# Patient Record
Sex: FEMALE | Race: OTHER | HISPANIC OR LATINO | URBAN - METROPOLITAN AREA
[De-identification: names, ages, dates, MRNs, and addresses within clinical notes are randomized per-mention and may not be internally consistent; named-entity substitution may affect disease eponyms.]

---

## 2018-05-25 ENCOUNTER — OUTPATIENT (OUTPATIENT)
Dept: OUTPATIENT SERVICES | Facility: HOSPITAL | Age: 53
LOS: 1 days | Discharge: HOME | End: 2018-05-25

## 2018-05-25 DIAGNOSIS — R10.9 UNSPECIFIED ABDOMINAL PAIN: ICD-10-CM

## 2018-06-25 ENCOUNTER — APPOINTMENT (OUTPATIENT)
Dept: VASCULAR SURGERY | Facility: CLINIC | Age: 53
End: 2018-06-25
Payer: COMMERCIAL

## 2018-06-25 VITALS
SYSTOLIC BLOOD PRESSURE: 130 MMHG | WEIGHT: 245 LBS | DIASTOLIC BLOOD PRESSURE: 80 MMHG | HEIGHT: 61 IN | BODY MASS INDEX: 46.26 KG/M2

## 2018-06-25 PROCEDURE — 99203 OFFICE O/P NEW LOW 30 MIN: CPT

## 2018-07-13 ENCOUNTER — APPOINTMENT (OUTPATIENT)
Dept: GASTROENTEROLOGY | Facility: CLINIC | Age: 53
End: 2018-07-13

## 2018-07-13 ENCOUNTER — OUTPATIENT (OUTPATIENT)
Dept: OUTPATIENT SERVICES | Facility: HOSPITAL | Age: 53
LOS: 1 days | Discharge: HOME | End: 2018-07-13

## 2018-07-13 VITALS — DIASTOLIC BLOOD PRESSURE: 86 MMHG | HEART RATE: 69 BPM | SYSTOLIC BLOOD PRESSURE: 123 MMHG | TEMPERATURE: 97.9 F

## 2018-08-27 ENCOUNTER — APPOINTMENT (OUTPATIENT)
Dept: VASCULAR SURGERY | Facility: CLINIC | Age: 53
End: 2018-08-27

## 2018-09-27 ENCOUNTER — APPOINTMENT (OUTPATIENT)
Dept: VASCULAR SURGERY | Facility: CLINIC | Age: 53
End: 2018-09-27

## 2019-01-31 ENCOUNTER — RESULT REVIEW (OUTPATIENT)
Age: 54
End: 2019-01-31

## 2019-01-31 ENCOUNTER — TRANSCRIPTION ENCOUNTER (OUTPATIENT)
Age: 54
End: 2019-01-31

## 2019-01-31 ENCOUNTER — OUTPATIENT (OUTPATIENT)
Dept: OUTPATIENT SERVICES | Facility: HOSPITAL | Age: 54
LOS: 1 days | Discharge: HOME | End: 2019-01-31

## 2019-01-31 VITALS — SYSTOLIC BLOOD PRESSURE: 107 MMHG | DIASTOLIC BLOOD PRESSURE: 91 MMHG | HEART RATE: 69 BPM | RESPIRATION RATE: 18 BRPM

## 2019-01-31 VITALS
TEMPERATURE: 99 F | HEIGHT: 61 IN | DIASTOLIC BLOOD PRESSURE: 79 MMHG | WEIGHT: 253.09 LBS | RESPIRATION RATE: 18 BRPM | SYSTOLIC BLOOD PRESSURE: 143 MMHG | HEART RATE: 62 BPM

## 2019-01-31 NOTE — CHART NOTE - NSCHARTNOTEFT_GEN_A_CORE
Patient underwent EGD with sedation. In recovery area patient was rubbing her left eye with a towel. She now complains that her left eye burns. I examined her at the bedside. She has intact eye movement and vision in both eyes. The left eye is a little red. We spoke via  # [id number] and she reports a burning sensation that is tolerable. She denies any foreign body sensation. Her eye was irrigated with saline by the CRNA. I advised that she not rub her eye anymore and see if it improves. If there is no improvement by tomorrow suggested she see an eye doctor and/or primary doctor. If sx worsen or vision is impaired recommended ER. Discussed plan with nurses in recovery who noted it and plan to follow up with her tomorrow via phone. Also informed Dr. Prince who agreed with plan.     Rom Bullard MD  Anesthesia

## 2019-01-31 NOTE — ASU DISCHARGE PLAN (ADULT/PEDIATRIC). - NOTIFY
Swelling that continues/Inability to Tolerate Liquids or Foods/Persistent Nausea and Vomiting/Fever greater than 101/Excessive Diarrhea/Bleeding that does not stop/Pain not relieved by Medications/Unable to Urinate

## 2019-01-31 NOTE — H&P PST ADULT - ATTENDING COMMENTS
Pt seen and consent obtained via the translation phone.  Pt is having egd for symptoms of GERD and for pre op assessment for bariatric surgery.

## 2019-02-04 LAB — SURGICAL PATHOLOGY STUDY: SIGNIFICANT CHANGE UP

## 2019-02-07 DIAGNOSIS — K29.50 UNSPECIFIED CHRONIC GASTRITIS WITHOUT BLEEDING: ICD-10-CM

## 2019-02-07 DIAGNOSIS — B96.81 HELICOBACTER PYLORI [H. PYLORI] AS THE CAUSE OF DISEASES CLASSIFIED ELSEWHERE: ICD-10-CM

## 2019-02-07 DIAGNOSIS — R12 HEARTBURN: ICD-10-CM

## 2019-02-21 PROBLEM — Z00.00 ENCOUNTER FOR PREVENTIVE HEALTH EXAMINATION: Noted: 2019-02-21

## 2019-03-08 ENCOUNTER — APPOINTMENT (OUTPATIENT)
Dept: GASTROENTEROLOGY | Facility: CLINIC | Age: 54
End: 2019-03-08

## 2019-03-08 ENCOUNTER — OUTPATIENT (OUTPATIENT)
Dept: OUTPATIENT SERVICES | Facility: HOSPITAL | Age: 54
LOS: 1 days | Discharge: HOME | End: 2019-03-08

## 2019-03-08 VITALS
BODY MASS INDEX: 47.58 KG/M2 | HEART RATE: 65 BPM | WEIGHT: 252 LBS | HEIGHT: 61 IN | DIASTOLIC BLOOD PRESSURE: 85 MMHG | SYSTOLIC BLOOD PRESSURE: 136 MMHG

## 2019-03-08 NOTE — ASSESSMENT
[FreeTextEntry1] : 53 year old female with PMH of HTN , varicose veins is here for follow up after EGD for the  evaluation for heartburn and preop clearance for bariatric surgery.\par Patient reports having EGD and colonoscopy in [place] 3 years ago and results are reported to be fine. no FH of cancer colon. \par EGD revealed a MARCIE lesion in the pharynx and gastritis , H pylori positive. \par \par # H Pylori Gastritis : \par Will treat with triple therapy. \par Will confirm eradication with stool testing.\par \par # Sub Epithelial lesion in the pharynx. \par Will refer to ENT for eval.\par \par # CRC Screening : \par Up to date with screening.\par Weight loss with diet and exercise advised.\par  Follow up with Bariatric surgery.

## 2019-03-08 NOTE — HISTORY OF PRESENT ILLNESS
[de-identified] : 53 year old female with PMH of HTN , varicose veins is here for follow up after EGD for the  evaluation for heartburn and preop clearance for bariatric surgery. patient reports having heartburn and epigastric pain for almost 10 years , worse with certain food ( like pasta or caffeine) associated with sore throat, occasional dysphagia. no nausea, vomiting , no hematemesis, melena or bleeding per rectum. no change in bowel habit. patient reports having EGD and colonoscopy in [place] 3 years ago and results are reported to be fine. no FH of cancer colon. \par EGD revealed a MARCIE lesion in the pharynx and gastritis , H pylori positive. \par Pt just started her PPI one day ago.

## 2019-03-15 DIAGNOSIS — K21.9 GASTRO-ESOPHAGEAL REFLUX DISEASE WITHOUT ESOPHAGITIS: ICD-10-CM

## 2019-03-15 DIAGNOSIS — K29.70 GASTRITIS, UNSPECIFIED, WITHOUT BLEEDING: ICD-10-CM

## 2019-03-22 ENCOUNTER — APPOINTMENT (OUTPATIENT)
Dept: GASTROENTEROLOGY | Facility: CLINIC | Age: 54
End: 2019-03-22

## 2019-05-03 ENCOUNTER — OUTPATIENT (OUTPATIENT)
Dept: OUTPATIENT SERVICES | Facility: HOSPITAL | Age: 54
LOS: 1 days | Discharge: HOME | End: 2019-05-03

## 2019-05-03 ENCOUNTER — APPOINTMENT (OUTPATIENT)
Dept: GASTROENTEROLOGY | Facility: CLINIC | Age: 54
End: 2019-05-03

## 2019-05-03 VITALS
BODY MASS INDEX: 47.39 KG/M2 | WEIGHT: 251 LBS | SYSTOLIC BLOOD PRESSURE: 170 MMHG | HEIGHT: 61 IN | DIASTOLIC BLOOD PRESSURE: 91 MMHG

## 2019-05-03 NOTE — HISTORY OF PRESENT ILLNESS
[Heartburn] : denies heartburn [Vomiting] : denies vomiting [Nausea] : denies nausea [Diarrhea] : denies diarrhea [Constipation] : denies constipation [Yellow Skin Or Eyes (Jaundice)] : denies jaundice [Abdominal Pain] : denies abdominal pain [Abdominal Swelling] : denies abdominal swelling [Rectal Pain] : denies rectal pain [de-identified] : 55 Y/O F is here for the follow up. [de-identified] : 53 Y/O F with PMH of epigastric pain, HTN was here last time in for epigastric pain and for clearance for Bariatric surgery.\par \par Upper GI endoscopy was done. \par Non erosive gastritis with biopsy positive for H.pylori.\par \par Eradication confirmed in April by stool test.\par \par Colonoscopy was done in

## 2019-05-03 NOTE — ASSESSMENT
[FreeTextEntry1] : 55 Y/O F with PMH of epigastric pain, HTN was here last time in for epigastric pain and for clearance for Bariatric surgery.\par \par Upper GI endoscopy was done. \par Non erosive gastritis with biopsy positive for H.pylori.\par Eradication confirmed in April/ 2019 by stool test.\par -Patient advised to set up an appointment with PMD to get a referral for bariatric surgery if needed.\par -Also during upper GI endoscopy, a large subepithelial lesion was identified.\par -ENT referral is arranged.\par \par Colonoscopy was done in [place] 2 years ago which was normal according to the patient.\par \par Follow up as needed\par

## 2019-05-03 NOTE — END OF VISIT
[FreeTextEntry3] : Pt is to see ENT for a pharyngeal MARCIE noted on recent egd and has an appt.  She is also to follow up with her PMD for further recommendations regarding treatement for obesity.

## 2019-05-03 NOTE — PHYSICAL EXAM
[General Appearance - Alert] : alert [Sclera] : the sclera and conjunctiva were normal [General Appearance - In No Acute Distress] : in no acute distress [Outer Ear] : the ears and nose were normal in appearance [Neck Appearance] : the appearance of the neck was normal [] : no respiratory distress [Arterial Pulses Carotid] : carotid pulses were normal with no bruits [Breast Appearance] : normal in appearance [Apical Impulse] : the apical impulse was normal [Bowel Sounds] : normal bowel sounds [No CVA Tenderness] : no ~M costovertebral angle tenderness [Oriented To Time, Place, And Person] : oriented to person, place, and time [Abnormal Walk] : normal gait

## 2019-05-08 ENCOUNTER — APPOINTMENT (OUTPATIENT)
Dept: OTOLARYNGOLOGY | Facility: CLINIC | Age: 54
End: 2019-05-08

## 2019-05-15 LAB — H PYLORI AG STL QL: NOT DETECTED

## 2019-05-16 ENCOUNTER — APPOINTMENT (OUTPATIENT)
Dept: OTOLARYNGOLOGY | Facility: CLINIC | Age: 54
End: 2019-05-16
Payer: MEDICAID

## 2019-05-16 DIAGNOSIS — J38.7 OTHER DISEASES OF LARYNX: ICD-10-CM

## 2019-05-16 DIAGNOSIS — D37.02 NEOPLASM OF UNCERTAIN BEHAVIOR OF TONGUE: ICD-10-CM

## 2019-05-16 PROCEDURE — 99203 OFFICE O/P NEW LOW 30 MIN: CPT | Mod: 25

## 2019-05-16 PROCEDURE — 31575 DIAGNOSTIC LARYNGOSCOPY: CPT

## 2019-05-16 NOTE — HISTORY OF PRESENT ILLNESS
[de-identified] :  ID# [id number]\par 54 Year old female comes in the office as a new patient c/o pharyngeal MARCIE.  [Neck Mass] : no neck mass [Difficulty Swallowing] : no difficulty swallowing [Painful Swallowing] : no painful swallowing [Hoarseness] : no hoarseness [Shortness of Breath] : no shortness of breath

## 2019-05-16 NOTE — PROCEDURE
[Topical Lidocaine] : topical lidocaine [Oxymetazoline HCl] : oxymetazoline HCl [Flexible Endoscope] : examined with the flexible endoscope [Normal] : posterior cricoid area had healthy pink mucosa in the interarytenoid area and the esophageal inlet [de-identified] : right BOT mass in vallecula

## 2019-06-14 ENCOUNTER — APPOINTMENT (OUTPATIENT)
Dept: SURGERY | Facility: CLINIC | Age: 54
End: 2019-06-14
Payer: MEDICAID

## 2019-06-14 VITALS
HEIGHT: 61 IN | WEIGHT: 253.2 LBS | DIASTOLIC BLOOD PRESSURE: 90 MMHG | BODY MASS INDEX: 47.8 KG/M2 | SYSTOLIC BLOOD PRESSURE: 148 MMHG

## 2019-06-14 PROCEDURE — 99204 OFFICE O/P NEW MOD 45 MIN: CPT

## 2019-06-14 RX ORDER — AMOXICILLIN 500 MG/1
500 TABLET, FILM COATED ORAL TWICE DAILY
Qty: 56 | Refills: 0 | Status: COMPLETED | COMMUNITY
Start: 2019-03-08 | End: 2019-06-14

## 2019-06-14 RX ORDER — CLARITHROMYCIN 500 MG/1
500 TABLET, FILM COATED ORAL TWICE DAILY
Qty: 28 | Refills: 0 | Status: COMPLETED | COMMUNITY
Start: 2019-03-08 | End: 2019-06-14

## 2019-06-19 ENCOUNTER — APPOINTMENT (OUTPATIENT)
Dept: SURGERY | Facility: CLINIC | Age: 54
End: 2019-06-19
Payer: SELF-PAY

## 2019-06-19 VITALS
HEIGHT: 61 IN | WEIGHT: 254 LBS | SYSTOLIC BLOOD PRESSURE: 142 MMHG | DIASTOLIC BLOOD PRESSURE: 84 MMHG | BODY MASS INDEX: 47.95 KG/M2

## 2019-06-19 PROCEDURE — ZZZZZ: CPT

## 2019-06-19 PROCEDURE — SI006: CPT

## 2019-06-26 NOTE — PHYSICAL EXAM
[Obese, well nourished, in no acute distress] : obese, well nourished, in no acute distress [Normal] : normal appearance, no rash, nodules, vesicles, ulcers, erythema [de-identified] : soft, obese, well-healing infraumbilical scar, well-healed pfannenstiel incision, no hernias, no masses [de-identified] : tearing up, mild anxiety

## 2019-06-26 NOTE — HISTORY OF PRESENT ILLNESS
[de-identified] : 55yo female with PMHx of HTN, GERD, headaches, VASQUEZ, back/LE joint pain, and morbid obesity with BMI 47.8 presenting for consultation of weight loss surgery/management. Patient states she has struggled with her weight since she had her hysterectomy 20 years ago and quit smoking 15 years ago. She reports that she weighed 145lb at that time, but quickly gained over 100lbs. She believes she eats correctly under the supervision of nutritionists; however, she has been gaining weight under their care. At this time, she does not have a regular exercise regimen, but she states that she ambulates a lot throughout the day. She does not do any form of resistance training. Of note, she reports occasional chest pain at rest that is worse with exertion. \par \par PCP - Dr. Crespo [phone]\par Previous EGD - 1/2019, H pylori+ treated with antibiotics\par Colonoscopy 3-4 years ago\par Smoker - former, quit 15 years ago

## 2019-06-26 NOTE — REASON FOR VISIT
[Initial Consult] : an initial consult for [Morbid Obesity (BMI>40)] : morbid obesity (bmi>40) [Family Member] : family member [FreeTextEntry1] : [id number]

## 2019-06-26 NOTE — CONSULT LETTER
[Please see my note below.] : Please see my note below. [Sincerely,] : Sincerely, [Dear  ___] : Dear  [unfilled], [FreeTextEntry3] : Drea White MD\par Bariatric & Minimally Invasive Surgery\par [facility]\par [phone]\par

## 2019-06-26 NOTE — ASSESSMENT
[FreeTextEntry1] : 53yo female with PMHx of HTN, GERD, headaches, VASQUEZ, back/LE joint pain, and morbid obesity with BMI 47.8 presenting for consultation of weight loss surgery/management. \par -discussed surgical options - gastric band, sleeve gastrectomy, moon-en-Y gastric bypass\par -discussed potential surgical complications for each option - including bleeding, infection, pain, hernia, leak, stricture, and blood clots\par -discussed smoking is prohibited\par -at this time, this patient only wants to have SLEEVE GASTRECTOMY\par -next step - bariatric education session at nearest convenience - scheduled for 6/19/19\par -pre-operative preparation - will include labs, PCP evaluation, EGD, nutritional evaluation (6 months), sleep study\par -will recommend nutritional evaluation with [facility] dietician\par -will require work up with cardiology regarding intermittent chest pain at rest\par -GERD - controlled with Omeprazole once/day, will need another EGD\par -return to office after further pre-operative work up is completed

## 2019-07-11 ENCOUNTER — APPOINTMENT (OUTPATIENT)
Dept: CARDIOLOGY | Facility: CLINIC | Age: 54
End: 2019-07-11

## 2019-07-11 ENCOUNTER — APPOINTMENT (OUTPATIENT)
Dept: CARDIOLOGY | Facility: CLINIC | Age: 54
End: 2019-07-11
Payer: MEDICAID

## 2019-07-11 ENCOUNTER — APPOINTMENT (OUTPATIENT)
Dept: SURGERY | Facility: CLINIC | Age: 54
End: 2019-07-11
Payer: MEDICAID

## 2019-07-11 VITALS — BODY MASS INDEX: 48.52 KG/M2 | HEIGHT: 61 IN | WEIGHT: 257 LBS

## 2019-07-11 PROCEDURE — 97802 MEDICAL NUTRITION INDIV IN: CPT

## 2019-07-11 PROCEDURE — 93000 ELECTROCARDIOGRAM COMPLETE: CPT

## 2019-07-11 PROCEDURE — 99204 OFFICE O/P NEW MOD 45 MIN: CPT | Mod: 25

## 2019-08-07 ENCOUNTER — APPOINTMENT (OUTPATIENT)
Dept: CARDIOLOGY | Facility: CLINIC | Age: 54
End: 2019-08-07

## 2019-08-12 ENCOUNTER — APPOINTMENT (OUTPATIENT)
Dept: SURGERY | Facility: CLINIC | Age: 54
End: 2019-08-12
Payer: MEDICAID

## 2019-08-12 VITALS — BODY MASS INDEX: 47.28 KG/M2 | WEIGHT: 250.4 LBS | HEIGHT: 61 IN

## 2019-08-12 PROCEDURE — 99212 OFFICE O/P EST SF 10 MIN: CPT

## 2019-08-19 ENCOUNTER — OUTPATIENT (OUTPATIENT)
Dept: OUTPATIENT SERVICES | Facility: HOSPITAL | Age: 54
LOS: 1 days | Discharge: HOME | End: 2019-08-19

## 2019-08-19 DIAGNOSIS — F50.9 EATING DISORDER, UNSPECIFIED: ICD-10-CM

## 2019-08-30 ENCOUNTER — OUTPATIENT (OUTPATIENT)
Dept: OUTPATIENT SERVICES | Facility: HOSPITAL | Age: 54
LOS: 1 days | Discharge: HOME | End: 2019-08-30

## 2019-08-30 ENCOUNTER — APPOINTMENT (OUTPATIENT)
Dept: GASTROENTEROLOGY | Facility: CLINIC | Age: 54
End: 2019-08-30
Payer: MEDICAID

## 2019-08-30 VITALS
HEART RATE: 49 BPM | WEIGHT: 247 LBS | DIASTOLIC BLOOD PRESSURE: 97 MMHG | BODY MASS INDEX: 46.63 KG/M2 | HEIGHT: 61 IN | SYSTOLIC BLOOD PRESSURE: 171 MMHG

## 2019-08-30 PROCEDURE — 99213 OFFICE O/P EST LOW 20 MIN: CPT | Mod: GC

## 2019-08-30 NOTE — PHYSICAL EXAM
[General Appearance - Alert] : alert [General Appearance - In No Acute Distress] : in no acute distress [Sclera] : the sclera and conjunctiva were normal [PERRL With Normal Accommodation] : pupils were equal in size, round, and reactive to light [Extraocular Movements] : extraocular movements were intact [Hearing Threshold Finger Rub Not [place]] : hearing was normal [Both Tympanic Membranes Were Examined] : both tympanic membranes were normal [Neck Appearance] : the appearance of the neck was normal [Neck Cervical Mass (___cm)] : no neck mass was observed [] : no respiratory distress [Respiration, Rhythm And Depth] : normal respiratory rhythm and effort [Exaggerated Use Of Accessory Muscles For Inspiration] : no accessory muscle use [Auscultation Breath Sounds / Voice Sounds] : lungs were clear to auscultation bilaterally [Apical Impulse] : the apical impulse was normal [Heart Rate And Rhythm] : heart rate was normal and rhythm regular [Murmurs] : no murmurs [Heart Sounds] : normal S1 and S2 [Heart Sounds Gallop] : no gallops [Heart Sounds Pericardial Friction Rub] : no pericardial rub [Arterial Pulses Carotid] : carotid pulses were normal with no bruits [Abdominal Aorta] : the abdominal aorta was normal [Bowel Sounds] : normal bowel sounds [Abdomen Soft] : soft [Abdomen Tenderness] : non-tender [Abdomen Mass (___ Cm)] : no abdominal mass palpated [No CVA Tenderness] : no ~M costovertebral angle tenderness [Nail Clubbing] : no clubbing  or cyanosis of the fingernails [Skin Color & Pigmentation] : normal skin color and pigmentation [Musculoskeletal - Swelling] : no joint swelling seen [Skin Turgor] : normal skin turgor [Deep Tendon Reflexes (DTR)] : deep tendon reflexes were 2+ and symmetric [Sensation] : the sensory exam was normal to light touch and pinprick [Oriented To Time, Place, And Person] : oriented to person, place, and time [FreeTextEntry1] : Obese appearing female

## 2019-08-30 NOTE — ASSESSMENT
[FreeTextEntry1] : 55 year old female with PMHx of GERD, HTN, H. pylori gastritis s/p eradication presented for clearance for bariatric surgery\par \par #GERD/Gastritis \par - renew protonix\par \par #Obesity\par - Will need clearance for surgery from primary care physician; f\par - Dr. White is on same EMR and has access to all testing results. \par \par # h/o H. 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 \par - Awaiting for authorization for CT

## 2019-08-30 NOTE — HISTORY OF PRESENT ILLNESS
[None] : had no significant interval events [Heartburn] : denies heartburn [Nausea] : denies nausea [Vomiting] : denies vomiting [Diarrhea] : denies diarrhea [Constipation] : denies constipation [Yellow Skin Or Eyes (Jaundice)] : denies jaundice [Abdominal Pain] : denies abdominal pain [Abdominal Swelling] : denies abdominal swelling [Rectal Pain] : denies rectal pain [GERD] : gastroesophageal reflux disease [_________] : Performed [unfilled] [de-identified] : This is a 54 year old female with PMHx of HTN, H pylori gastritis, HTN who presents to the GI clinic for follow up for bariatric surgery with copies of test results. Reports no acute complaints at this time. States that she is feeling fine. Pt. PCP is Dr. Crespo. Surgeon to perform the surgery: Dr. White.  [de-identified] : normal mucosa, non erosive gastritis. H. pylori positive. Subepithelial lesion in pharynx and lateral uvula

## 2019-08-30 NOTE — END OF VISIT
[FreeTextEntry3] : Pt seen and examined.  Pt was HP positive and was treated.  Eradication has been confirmed.  Pt had gastritis on egd in Jan and is on pantoprazole which will be renewed.  She is due to see ENT in follow up for a pharyngeal sub epithelial lesion and will over to their office today.

## 2019-09-06 ENCOUNTER — OUTPATIENT (OUTPATIENT)
Dept: OUTPATIENT SERVICES | Facility: HOSPITAL | Age: 54
LOS: 1 days | Discharge: HOME | End: 2019-09-06

## 2019-09-06 ENCOUNTER — APPOINTMENT (OUTPATIENT)
Dept: PULMONOLOGY | Facility: CLINIC | Age: 54
End: 2019-09-06
Payer: MEDICAID

## 2019-09-06 VITALS
TEMPERATURE: 98.1 F | WEIGHT: 252 LBS | HEIGHT: 61 IN | DIASTOLIC BLOOD PRESSURE: 82 MMHG | SYSTOLIC BLOOD PRESSURE: 164 MMHG | OXYGEN SATURATION: 98 % | HEART RATE: 63 BPM | BODY MASS INDEX: 47.58 KG/M2

## 2019-09-06 PROCEDURE — 99203 OFFICE O/P NEW LOW 30 MIN: CPT | Mod: GC

## 2019-09-06 NOTE — HISTORY OF PRESENT ILLNESS
[Difficulty Breathing During Exertion] : denies dyspnea on exertion [Feelings Of Weakness On Exertion] : denies exercise intolerance [Cough] : denies coughing [Wheezing] : denies wheezing [Regional Soft Tissue Swelling Both Lower Extremities] : denies lower extremity edema [Chest Pain Or Discomfort] : denies chest pain [Fever] : denies fever [___ Year Quit] : ~He/She~ quit smoking in [unfilled] [___ Pack Year History] : [unfilled] pack year history [Snoring] : Snoring [Stable] : are stable [de-identified] : Pt presents for bariatric surgery clearance, awaiting CT for subendothelial pharyngeal mass. [FreeTextEntry1] :  Anel ID [id number]\par  Woody ID [id number]\par \par This is a 54 year old female with PMHx of HTN, H pylori gastritis s/p confirmed eradication, who presents to the pulmonology clinic for  bariatric surgery clearance. Reports no acute complaints at this time. States that she is feeling fine. Denies dyspnea, chest pain, edema, wheezing, coughing. Admits some snoring but denies frequent awakening. Pt didn’t get neck CT or follow up with ENT yet. \par \par PCP is Dr. Crespo. Surgeon to perform the surgery: Dr. White.

## 2019-09-06 NOTE — PHYSICAL EXAM
[General Appearance - Well Developed] : well developed [Normal Appearance] : normal appearance [Neck Appearance] : the appearance of the neck was normal [Heart Rate And Rhythm] : heart rate and rhythm were normal [Heart Sounds] : normal S1 and S2 [] : no respiratory distress [Respiration, Rhythm And Depth] : normal respiratory rhythm and effort [Exaggerated Use Of Accessory Muscles For Inspiration] : no accessory muscle use [Bowel Sounds] : normal bowel sounds [Auscultation Breath Sounds / Voice Sounds] : lungs were clear to auscultation bilaterally [Abdomen Soft] : soft [Abdomen Tenderness] : non-tender [Oriented To Time, Place, And Person] : oriented to person, place, and time [Impaired Insight] : insight and judgment were intact [Affect] : the affect was normal [FreeTextEntry1] : Right tonsillar area slightly enlarged

## 2019-09-06 NOTE — ASSESSMENT
[FreeTextEntry1] : 55 year old female with PMHx of GERD, HTN, H. 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 \par - Dr. White is on same EMR and has access to all testing results. \par \par \par Pt can return in one month or after ENT diagnosis is confirmed regarding possible need for sleep study and bariatric surgery clearance.

## 2019-09-06 NOTE — END OF VISIT
[] : Resident [Time Spent: ___ minutes] : I have spent [unfilled] minutes of face to face time with the patient [FreeTextEntry3] : needs ent reassessment asap

## 2019-09-09 ENCOUNTER — FORM ENCOUNTER (OUTPATIENT)
Age: 54
End: 2019-09-09

## 2019-09-10 ENCOUNTER — APPOINTMENT (OUTPATIENT)
Dept: SURGERY | Facility: CLINIC | Age: 54
End: 2019-09-10
Payer: MEDICAID

## 2019-09-10 ENCOUNTER — OUTPATIENT (OUTPATIENT)
Dept: OUTPATIENT SERVICES | Facility: HOSPITAL | Age: 54
LOS: 1 days | Discharge: HOME | End: 2019-09-10
Payer: MEDICAID

## 2019-09-10 VITALS — BODY MASS INDEX: 47.73 KG/M2 | WEIGHT: 252.8 LBS | HEIGHT: 61 IN

## 2019-09-10 DIAGNOSIS — J38.7 OTHER DISEASES OF LARYNX: ICD-10-CM

## 2019-09-10 DIAGNOSIS — I83.893 VARICOSE VEINS OF BILATERAL LOWER EXTREMITIES WITH OTHER COMPLICATIONS: ICD-10-CM

## 2019-09-10 PROCEDURE — 99212 OFFICE O/P EST SF 10 MIN: CPT

## 2019-09-10 PROCEDURE — 70492 CT SFT TSUE NCK W/O & W/DYE: CPT | Mod: 26

## 2019-09-11 ENCOUNTER — APPOINTMENT (OUTPATIENT)
Dept: OTOLARYNGOLOGY | Facility: CLINIC | Age: 54
End: 2019-09-11
Payer: MEDICAID

## 2019-09-11 DIAGNOSIS — R59.0 LOCALIZED ENLARGED LYMPH NODES: ICD-10-CM

## 2019-09-11 DIAGNOSIS — E04.1 NONTOXIC SINGLE THYROID NODULE: ICD-10-CM

## 2019-09-11 PROCEDURE — 99214 OFFICE O/P EST MOD 30 MIN: CPT | Mod: 25

## 2019-09-11 PROCEDURE — 31231 NASAL ENDOSCOPY DX: CPT

## 2019-09-11 NOTE — PHYSICAL EXAM
[de-identified] : cervical lymphadenopathy [de-identified] : tenderness bilateral [Midline] : trachea located in midline position [Normal] : no rashes

## 2019-09-11 NOTE — PROCEDURE
[Mass] : a mass [Oxymetazoline HCl] : oxymetazoline HCl [Topical Lidocaine] : topical lidocaine [Congested] : congested [Obstructing Posterior Choanae] : the adenoids obstructed the posterior choanae [FreeTextEntry6] : The following anatomic sites were directly examined in a sequential fashion:\par The scope was introduced in the nasal passage between the middle and inferior turbinates to exam the inferior portion of the middle meatus and the fontanelle, as well as the maxillary ostia. Next, the scope was passed medically and posteriorly to the middle turbinates to examine the sphenoethmoid recess and the superior turbinate region.\par  [FreeTextEntry3] : left nasopharyngeal mass [Flexible Endoscope] : examined with the flexible endoscope [Normal] : posterior cricoid area had healthy pink mucosa in the interarytenoid area and the esophageal inlet

## 2019-09-11 NOTE — HISTORY OF PRESENT ILLNESS
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 Patient states she went for CT yesterday results were reviewed by me and demonstrated nasopharyngeal mass, enlarged level 2 lymph nodes and a thyroid nodule. . Patient c/o coughing when eating food. No dysphagia. Pt has no other complaints.

## 2019-09-24 ENCOUNTER — FORM ENCOUNTER (OUTPATIENT)
Age: 54
End: 2019-09-24

## 2019-09-25 ENCOUNTER — OUTPATIENT (OUTPATIENT)
Dept: OUTPATIENT SERVICES | Facility: HOSPITAL | Age: 54
LOS: 1 days | Discharge: HOME | End: 2019-09-25
Payer: MEDICAID

## 2019-09-25 ENCOUNTER — OTHER (OUTPATIENT)
Age: 54
End: 2019-09-25

## 2019-09-25 ENCOUNTER — RESULT REVIEW (OUTPATIENT)
Age: 54
End: 2019-09-25

## 2019-09-25 PROCEDURE — 88172 CYTP DX EVAL FNA 1ST EA SITE: CPT | Mod: 26

## 2019-09-25 PROCEDURE — 10005 FNA BX W/US GDN 1ST LES: CPT

## 2019-09-25 PROCEDURE — 88112 CYTOPATH CELL ENHANCE TECH: CPT | Mod: 26,59

## 2019-09-25 PROCEDURE — 88161 CYTOPATH SMEAR OTHER SOURCE: CPT | Mod: 26,59

## 2019-09-25 PROCEDURE — 88188 FLOWCYTOMETRY/READ 9-15: CPT

## 2019-09-25 PROCEDURE — 88173 CYTOPATH EVAL FNA REPORT: CPT | Mod: 26

## 2019-09-25 PROCEDURE — 88177 CYTP FNA EVAL EA ADDL: CPT | Mod: 26

## 2019-09-25 PROCEDURE — 88305 TISSUE EXAM BY PATHOLOGIST: CPT | Mod: 26

## 2019-09-25 NOTE — PROGRESS NOTE ADULT - SUBJECTIVE AND OBJECTIVE BOX
INTERVENTIONAL RADIOLOGY BRIEF-OPERATIVE NOTE    Procedure: R isthmus nx; left neck LN biopsy    Pre-Op Diagnosis:  NUO    Post-Op Diagnosis:  NUO    Attending: Annie  Resident: None    Anesthesia (type):  [ ] General Anesthesia  [ ] Sedation  [ ] Spinal Anesthesia  [ x] Local/Regional    Contrast: 0    Estimated Blood Loss: 0    Condition:   [ ] Critical  [ ] Serious  [ ] Fair   [x ] Good    Findings/Follow up Plan of Care:  2.5 cm r isthmus nodule; 3 FNAs                                                   3.5 cm left neck LN- 3 cores    Specimens Removed: As above    Implants:  NA    Complications: NA    Disposition: DC home      Please call Interventional Radiology [phone] with any questions, concerns, or issues.

## 2019-09-26 ENCOUNTER — LABORATORY RESULT (OUTPATIENT)
Age: 54
End: 2019-09-26

## 2019-09-26 ENCOUNTER — APPOINTMENT (OUTPATIENT)
Dept: OTOLARYNGOLOGY | Facility: CLINIC | Age: 54
End: 2019-09-26
Payer: MEDICAID

## 2019-09-26 PROCEDURE — 42804 BIOPSY OF UPPER NOSE/THROAT: CPT

## 2019-09-26 PROCEDURE — 31231 NASAL ENDOSCOPY DX: CPT

## 2019-09-30 LAB — NON-GYNECOLOGICAL CYTOLOGY STUDY: SIGNIFICANT CHANGE UP

## 2019-10-01 ENCOUNTER — OUTPATIENT (OUTPATIENT)
Dept: OUTPATIENT SERVICES | Facility: HOSPITAL | Age: 54
LOS: 1 days | End: 2019-10-01
Payer: MEDICAID

## 2019-10-01 LAB — TM INTERPRETATION: SIGNIFICANT CHANGE UP

## 2019-10-02 LAB — NON-GYNECOLOGICAL CYTOLOGY STUDY: SIGNIFICANT CHANGE UP

## 2019-10-04 DIAGNOSIS — R59.9 ENLARGED LYMPH NODES, UNSPECIFIED: ICD-10-CM

## 2019-10-04 DIAGNOSIS — E04.1 NONTOXIC SINGLE THYROID NODULE: ICD-10-CM

## 2019-10-08 ENCOUNTER — APPOINTMENT (OUTPATIENT)
Dept: SURGERY | Facility: CLINIC | Age: 54
End: 2019-10-08

## 2019-10-10 ENCOUNTER — MESSAGE (OUTPATIENT)
Age: 54
End: 2019-10-10

## 2019-10-10 ENCOUNTER — APPOINTMENT (OUTPATIENT)
Dept: CARDIOLOGY | Facility: CLINIC | Age: 54
End: 2019-10-10

## 2019-10-11 ENCOUNTER — OUTPATIENT (OUTPATIENT)
Dept: OUTPATIENT SERVICES | Facility: HOSPITAL | Age: 54
LOS: 1 days | Discharge: HOME | End: 2019-10-11

## 2019-10-11 ENCOUNTER — APPOINTMENT (OUTPATIENT)
Dept: PULMONOLOGY | Facility: CLINIC | Age: 54
End: 2019-10-11
Payer: MEDICAID

## 2019-10-11 VITALS
HEART RATE: 65 BPM | WEIGHT: 251 LBS | BODY MASS INDEX: 47.39 KG/M2 | SYSTOLIC BLOOD PRESSURE: 141 MMHG | HEIGHT: 61 IN | OXYGEN SATURATION: 98 % | DIASTOLIC BLOOD PRESSURE: 83 MMHG

## 2019-10-11 LAB
ANION GAP SERPL CALC-SCNC: 12 MMOL/L
BASOPHILS # BLD AUTO: 0.06 K/UL
BASOPHILS NFR BLD AUTO: 0.6 %
BUN SERPL-MCNC: 10 MG/DL
CALCIUM SERPL-MCNC: 9.3 MG/DL
CHLORIDE SERPL-SCNC: 103 MMOL/L
CO2 SERPL-SCNC: 27 MMOL/L
CREAT SERPL-MCNC: 0.6 MG/DL
EOSINOPHIL # BLD AUTO: 0.23 K/UL
EOSINOPHIL NFR BLD AUTO: 2.3 %
GLUCOSE SERPL-MCNC: 82 MG/DL
HCT VFR BLD CALC: 39.3 %
HGB BLD-MCNC: 12.4 G/DL
IMM GRANULOCYTES NFR BLD AUTO: 0.4 %
LYMPHOCYTES # BLD AUTO: 2.82 K/UL
LYMPHOCYTES NFR BLD AUTO: 27.7 %
MAN DIFF?: NORMAL
MCHC RBC-ENTMCNC: 23.6 PG
MCHC RBC-ENTMCNC: 31.6 G/DL
MCV RBC AUTO: 74.7 FL
MONOCYTES # BLD AUTO: 0.75 K/UL
MONOCYTES NFR BLD AUTO: 7.4 %
NEUTROPHILS # BLD AUTO: 6.29 K/UL
NEUTROPHILS NFR BLD AUTO: 61.6 %
PLATELET # BLD AUTO: 327 K/UL
POTASSIUM SERPL-SCNC: 5 MMOL/L
RBC # BLD: 5.26 M/UL
RBC # FLD: 15.8 %
SODIUM SERPL-SCNC: 142 MMOL/L
WBC # FLD AUTO: 10.19 K/UL

## 2019-10-11 PROCEDURE — 99213 OFFICE O/P EST LOW 20 MIN: CPT | Mod: GC

## 2019-10-11 NOTE — PHYSICAL EXAM
[General Appearance - Well Developed] : well developed [General Appearance - Well Nourished] : well nourished [Normal Appearance] : normal appearance [Well Groomed] : well groomed [No Deformities] : no deformities [General Appearance - In No Acute Distress] : no acute distress [Normal Conjunctiva] : the conjunctiva exhibited no abnormalities [Eyelids - No Xanthelasma] : the eyelids demonstrated no xanthelasmas [Normal Oropharynx] : normal oropharynx [Neck Appearance] : the appearance of the neck was normal [Neck Cervical Mass (___cm)] : no neck mass was observed [Jugular Venous Distention Increased] : there was no jugular-venous distention [Thyroid Diffuse Enlargement] : the thyroid was not enlarged [Thyroid Nodule] : there were no palpable thyroid nodules [Heart Rate And Rhythm] : heart rate and rhythm were normal [Heart Sounds] : normal S1 and S2 [Murmurs] : no murmurs present [Auscultation Breath Sounds / Voice Sounds] : lungs were clear to auscultation bilaterally [Exaggerated Use Of Accessory Muscles For Inspiration] : no accessory muscle use [Respiration, Rhythm And Depth] : normal respiratory rhythm and effort [Abdomen Soft] : soft [Abdomen Tenderness] : non-tender [Abnormal Walk] : normal gait [Abdomen Mass (___ Cm)] : no abdominal mass palpated [Gait - Sufficient For Exercise Testing] : the gait was sufficient for exercise testing [Nail Clubbing] : no clubbing of the fingernails [Cyanosis, Localized] : no localized cyanosis [Petechial Hemorrhages (___cm)] : no petechial hemorrhages [Skin Color & Pigmentation] : normal skin color and pigmentation [] : no ischemic changes [No Venous Stasis] : no venous stasis [Skin Lesions] : no skin lesions [No Skin Ulcers] : no skin ulcer [No Xanthoma] : no  xanthoma was observed [Deep Tendon Reflexes (DTR)] : deep tendon reflexes were 2+ and symmetric [Sensation] : the sensory exam was normal to light touch and pinprick [No Focal Deficits] : no focal deficits [Affect] : the affect was normal [Oriented To Time, Place, And Person] : oriented to person, place, and time [Impaired Insight] : insight and judgment were intact [FreeTextEntry1] : morbidly obese

## 2019-10-11 NOTE — ASSESSMENT
[FreeTextEntry1] : 55 year old female with PMHx of GERD, HTN, H. 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 \par  \par Suspected SEMAJ\par -morbidly obese BMI 47\par -recommend sleep study\par \par RTC after sleep study in 6-8 weeks

## 2019-10-11 NOTE — HISTORY OF PRESENT ILLNESS
[Stable] : are stable [None] : The patient is currently asymptomatic [Snoring] : snoring [FreeTextEntry1] : 54 year old female with PMHx of HTN, H pylori gastritis s/p confirmed eradication presenting for follow up after presenting to pulmonology clinic for bariatric surgery clearance. She was sent to ENT to evaluate a nasopharyngeal mass and told to come back after for the clearance. She went to ENT who did a biopsy of the mass which showed mucoid material. She was released from the bariatric surgery program and as such is no longer going to get the surgery. \par \par Otherwise she feels fine and has no current complaints. \par \par Interpretor Jacobo [phone]

## 2019-10-16 ENCOUNTER — EMERGENCY (EMERGENCY)
Facility: HOSPITAL | Age: 54
LOS: 0 days | Discharge: HOME | End: 2019-10-16
Admitting: EMERGENCY MEDICINE
Payer: MEDICAID

## 2019-10-16 VITALS
DIASTOLIC BLOOD PRESSURE: 81 MMHG | RESPIRATION RATE: 20 BRPM | SYSTOLIC BLOOD PRESSURE: 146 MMHG | TEMPERATURE: 96 F | OXYGEN SATURATION: 100 % | HEART RATE: 74 BPM
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
## 2019-10-16 DIAGNOSIS — S60.221A CONTUSION OF RIGHT HAND, INITIAL ENCOUNTER: ICD-10-CM
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
## 2019-10-16 DIAGNOSIS — M54.9 DORSALGIA, UNSPECIFIED: ICD-10-CM

## 2019-10-16 DIAGNOSIS — S69.91XA UNSPECIFIED INJURY OF RIGHT WRIST, HAND AND FINGER(S), INITIAL ENCOUNTER: ICD-10-CM

## 2019-10-16 DIAGNOSIS — R51 HEADACHE: ICD-10-CM

## 2019-10-16 DIAGNOSIS — R20.2 PARESTHESIA OF SKIN: ICD-10-CM
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
## 2019-10-16 DIAGNOSIS — M54.2 CERVICALGIA: ICD-10-CM

## 2019-10-16 PROCEDURE — 73130 X-RAY EXAM OF HAND: CPT | Mod: 26,RT

## 2019-10-16 PROCEDURE — 99283 EMERGENCY DEPT VISIT LOW MDM: CPT

## 2019-10-16 RX ORDER — IBUPROFEN 200 MG
600 TABLET ORAL ONCE
Refills: 0 | Status: COMPLETED | OUTPATIENT
Start: 2019-10-16 | End: 2019-10-16

## 2019-10-16 RX ADMIN — Medication 600 MILLIGRAM(S): at 14:04

## 2019-10-16 NOTE — ED PROVIDER NOTE - NSFOLLOWUPCLINICS_GEN_ALL_ED_FT
[facility]  Orthpedic  [address] [place], [place]   Phone: [phone]  Fax:   Follow Up Time: 1-3 Days

## 2019-10-16 NOTE — ED PROVIDER NOTE - OBJECTIVE STATEMENT
EP is a 55 y/o female with a PMH of HTN who presents to the ED s/p fall about an hour ago complaining of constant nonradiating sharp right hand pain, and left lower back. Patient states she was walking across the street and fell after walking on an unleveled patch in the road. Patient denies hitting her head. Patient states she landed on her right hand and rolled over onto her back. Patient states the fall was witnessed and was helped up be pedestrians. Patient states she was ambulatory at the scene. Patient admits to numbness and tingling in her right hand. Patient admits to neck pain, and headaches. Patient states she vomited after falling. Patient denies numbness and tingling in the lower extremities, chest pain, sob, dizziness, or visual changes.

## 2019-10-16 NOTE — ED PROVIDER NOTE - PROGRESS NOTE DETAILS
I supervised PA fellow care Patient advised of discharge instructions.  called: [phone],  name Salomon

## 2019-10-16 NOTE — ED ADULT NURSE NOTE - NSIMPLEMENTINTERV_GEN_ALL_ED
Implemented All Universal Safety Interventions:  [place] to call system. Call bell, personal items and telephone within reach. Instruct patient to call for assistance. Room bathroom lighting operational. Non-slip footwear when patient is off stretcher. Physically safe environment: no spills, clutter or unnecessary equipment. Stretcher in lowest position, wheels locked, appropriate side rails in place.

## 2019-10-16 NOTE — ED PROVIDER NOTE - NS ED ROS FT
EYES: No visual changes.  CARD: No chest pain, palpitations  RESP: No SOB, cough, hemoptysis. No hx of asthma or COPD  GI: No abdominal pain   MS: lower back pain, neck pain, and right hand pain.   NEURO: headache. no dizziness, paresthesias or LOC

## 2019-10-16 NOTE — ED PROVIDER NOTE - PATIENT PORTAL LINK FT
You can access the FollowMyHealth Patient Portal offered by [facility] by registering at the following website: [email or web address]. By joining [facility]’s FollowMyHealth portal, you will also be able to view your health information using other applications (apps) compatible with our system.

## 2019-10-16 NOTE — ED PROVIDER NOTE - NSFOLLOWUPINSTRUCTIONS_ED_ALL_ED_FT
Contusión  Theodore contusión es un hematoma profundo. Las contusiones son el resultado de theodore lesión contundente en tejidos y fibras musculares debajo de la piel. La lesión causa sangrado debajo de la piel. La piel que recubre la contusión puede volverse walt, morada o amarilla. Las lesiones menores le darán theodore contusión indolora, aubrey las contusiones más graves pueden permanecer dolorosas e inflamadas mercedez algunas semanas.    ¿Cuales son las causas?  Esta condición generalmente es causada por un golpe, un trauma o theodore fuerza directa en un área del cuerpo.    ¿Cuáles son los signos o síntomas?  Los síntomas de esta afección incluyen:    Hinchazón del área lesionada.  Dolor y sensibilidad en el área lesionada.  Descoloramiento. El área puede tener enrojecimiento y luego volverse walt, púrpura o amarillo.    ¿Cómo se diagnostica esto?  Esta condición se diagnostica con base en un examen físico y un historial médico. Es posible que se necesite theodore radiografía, theodore tomografía computarizada o theodore resonancia magnética para determinar si hay lesiones asociadas, gina fracturas de huesos.    ¿Cómo se trata esto?  El tratamiento específico para esta afección depende de qué área del cuerpo se lesionó. En general, el mejor tratamiento para theodore contusión es descansar, aplicar hielo, aplicar presión (compresión) y elevar el área lesionada. [place] a menudo se llama la estrategia RICE. También se pueden recomendar medicamentos antiinflamatorios de venta jocelyn para el control del dolor.    Siga estas instrucciones en casa:  Descansa el área lesionada.  Si se le indica, aplique hielo en el área lesionada:    Alfonso hielo en theodore bolsa de plástico.  Coloque theodore toalla entre sheridan piel y la bolsa.  Deje el hielo encendido por 20 minutos, 2-3 veces por día.    Si se le indica, aplique theodore ligera compresión en el área lesionada con theodore venda elástica. Asegúrese de que el vendaje no esté demasiado apretado. Retire y vuelva a aplicar el vendaje según las indicaciones de sheridan proveedor de atención médica.  Si es posible, eleve (eleve) el área lesionada por encima del nivel de sheridan corazón mientras está sentado o acostado.  [place] medicamentos de venta jocelyn y recetados solo según lo indicado por sheridan proveedor de atención médica.  Póngase en contacto con un proveedor de atención médica si:  Chelle síntomas no mejoran después de varios días de tratamiento.  Chelle síntomas empeoran.  Tiene dificultad para  el área lesionada.  Obtenga ayuda de inmediato si:  Tienes dolor severo.  Tiene entumecimiento en theodore mano o pie.  Sheridan mano o pie se pone pálido o frío.  Esta información no pretende reemplazar los consejos que le rasheed sheridan proveedor de atención médica. Asegúrese de discutir cualquier pregunta que tenga con sheridan proveedor de atención médica.

## 2019-10-16 NOTE — ED PROVIDER NOTE - PHYSICAL EXAMINATION
CONST: Well appearing in NAD  EYES: EOMI  NECK: Non-tender  CARD: Normal S1 S2; Normal rate and rhythm  RESP: Equal BS B/L, No wheezes, rhonchi or rales. No distress  GI: Soft, non-tender, non-distended.  MS: tenderness to palpation of the left paraspinal muscles. tenderness to palpation of the right first, second, and third metacarpals. limited rom to the index and middle finger due to pain otherwise, normal ROM in remaining extremities. No midline spinal tenderness.  SKIN: bruising to the palm of the right hand. Warm, dry, no acute rashes. Good turgor.   NEURO: A&Ox3, No focal deficits. Strength 5/5 with no sensory deficits. Steady gait.

## 2019-10-16 NOTE — ED ADULT TRIAGE NOTE - CHIEF COMPLAINT QUOTE
pt presents to ED c/o right hand pain, s/p fall today. Pt also c/o back pain, denies blood thinners. Denies head trauma.

## 2019-10-16 NOTE — ED PROVIDER NOTE - CLINICAL SUMMARY MEDICAL DECISION MAKING FREE TEXT BOX
Pt with hand injury. Concern for fx ruled out with Xray. Pt has hand contusion. Will advise ice, elevation. FU with hand clinic if any issues. I have discussed the discharge plan with the patient. The patient agrees with the plan, as discussed.  The patient understands Emergency Department diagnosis is a preliminary diagnosis often based on limited information and that the patient must adhere to the follow-up plan as discussed.  The patient understands that if the symptoms worsen or if prescribed medications do not have the desired/planned effect that the patient may return to the Emergency Department at any time for further evaluation and treatment.

## 2019-10-17 DIAGNOSIS — Z71.89 OTHER SPECIFIED COUNSELING: ICD-10-CM

## 2019-10-25 ENCOUNTER — OUTPATIENT (OUTPATIENT)
Dept: OUTPATIENT SERVICES | Facility: HOSPITAL | Age: 54
LOS: 1 days | Discharge: HOME | End: 2019-10-25

## 2019-10-28 DIAGNOSIS — G47.33 OBSTRUCTIVE SLEEP APNEA (ADULT) (PEDIATRIC): ICD-10-CM

## 2019-10-28 PROBLEM — I10 ESSENTIAL (PRIMARY) HYPERTENSION: Chronic | Status: ACTIVE | Noted: 2019-10-16

## 2020-01-17 ENCOUNTER — APPOINTMENT (OUTPATIENT)
Dept: GASTROENTEROLOGY | Facility: CLINIC | Age: 55
End: 2020-01-17
Payer: MEDICAID

## 2020-01-17 ENCOUNTER — OUTPATIENT (OUTPATIENT)
Dept: OUTPATIENT SERVICES | Facility: HOSPITAL | Age: 55
LOS: 1 days | Discharge: HOME | End: 2020-01-17

## 2020-01-17 VITALS
HEIGHT: 61 IN | WEIGHT: 250 LBS | BODY MASS INDEX: 47.2 KG/M2 | DIASTOLIC BLOOD PRESSURE: 82 MMHG | HEART RATE: 67 BPM | SYSTOLIC BLOOD PRESSURE: 136 MMHG

## 2020-01-17 DIAGNOSIS — Z86.19 PERSONAL HISTORY OF OTHER INFECTIOUS AND PARASITIC DISEASES: ICD-10-CM

## 2020-01-17 PROCEDURE — 99214 OFFICE O/P EST MOD 30 MIN: CPT

## 2020-01-17 NOTE — REVIEW OF SYSTEMS
[Fever] : no fever [Eye Pain] : no eye pain [Earache] : no earache [Palpitations] : no palpitations [SOB on Exertion] : no shortness of breath during exertion [As Noted in HPI] : as noted in HPI [Convulsions] : no convulsions [Easy Bleeding] : no tendency for easy bleeding

## 2020-01-17 NOTE — ASSESSMENT
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

## 2020-01-17 NOTE — HISTORY OF PRESENT ILLNESS
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 Patient has history of GERD , on PPI , mentioned when she stop PPI , she has symptoms again , no dysphagia or odynophagia , no abdominal pain , no nausea or vomiting , no diarrhea or constipation , no fresh blood per rectum , no melena. \par Patient was discharged from bariatric program because of concerns of non compliance \par \par last colonoscopy 3 years ago in [place] , was normal according to patient \par EGD 1/2019 normal mucosa, non erosive gastritis. H. pylori positive. Subepithelial lesion in pharynx and lateral uvula.

## 2020-01-17 NOTE — PHYSICAL EXAM
[General Appearance - Well Nourished] : well nourished [General Appearance - Alert] : alert [Neck Appearance] : the appearance of the neck was normal [General Appearance - Well Developed] : well developed [Outer Ear] : the ears and nose were normal in appearance [Respiration, Rhythm And Depth] : normal respiratory rhythm and effort [Auscultation Breath Sounds / Voice Sounds] : lungs were clear to auscultation bilaterally [Exaggerated Use Of Accessory Muscles For Inspiration] : no accessory muscle use [Heart Sounds] : normal S1 and S2 [Apical Impulse] : the apical impulse was normal [Heart Sounds Gallop] : no gallops [Abdomen Soft] : soft [Bowel Sounds] : normal bowel sounds [Abdomen Tenderness] : non-tender [] : no hepato-splenomegaly [No CVA Tenderness] : no ~M costovertebral angle tenderness [Musculoskeletal - Swelling] : no joint swelling seen [Nail Clubbing] : no clubbing  or cyanosis of the fingernails [Abnormal Walk] : normal gait [Motor Tone] : muscle strength and tone were normal [Skin Color & Pigmentation] : normal skin color and pigmentation [Oriented To Time, Place, And Person] : oriented to person, place, and time [Sclera] : the sclera and conjunctiva were normal

## 2020-01-17 NOTE — END OF VISIT
[] : Fellow [FreeTextEntry2] : gerd\par continue PPI\par \par next colonoscopy in 2 years [Time Spent: ___ minutes] : I have spent [unfilled] minutes of face to face time with the patient

## 2020-01-29 ENCOUNTER — APPOINTMENT (OUTPATIENT)
Dept: CARDIOLOGY | Facility: CLINIC | Age: 55
End: 2020-01-29

## 2020-06-12 ENCOUNTER — OUTPATIENT (OUTPATIENT)
Dept: OUTPATIENT SERVICES | Facility: HOSPITAL | Age: 55
LOS: 1 days | Discharge: HOME | End: 2020-06-12

## 2020-06-12 ENCOUNTER — APPOINTMENT (OUTPATIENT)
Dept: PULMONOLOGY | Facility: CLINIC | Age: 55
End: 2020-06-12
Payer: MEDICAID

## 2020-06-12 VITALS
SYSTOLIC BLOOD PRESSURE: 132 MMHG | BODY MASS INDEX: 47.2 KG/M2 | HEIGHT: 61 IN | HEART RATE: 78 BPM | DIASTOLIC BLOOD PRESSURE: 85 MMHG | WEIGHT: 250 LBS | OXYGEN SATURATION: 97 %

## 2020-06-12 PROCEDURE — 99213 OFFICE O/P EST LOW 20 MIN: CPT | Mod: GC

## 2020-06-12 NOTE — ASSESSMENT
[FreeTextEntry1] : Patient is a 55 year old female with PMH of SEMAJ, GERD, HTN, H. pylori gastritis s/p eradication presented for follow up for sleep study and discussion regarding Bariatric surgery clearance\par \par # SEMAJ - Severe\par - Full report in chart - Only 2 hours Sleep, 5% in REM, 126 Hypopnea/apnea episodes - Severity Index 68- Severe\par - Cpap machine will be set up \par - Patient counselled on importance of the use of the machine and understood and agreed to plan \par \par # Bariatric surgery clearance\par - released from bariatric program by surgeons. \par - it was explained that at this time she was dropped from the program and she should follow up with the bariatric surgeons in regards to being reevaluated. At this time she can come back for Clearance. 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

## 2020-06-12 NOTE — HISTORY OF PRESENT ILLNESS
[Obstructive Sleep Apnea] : obstructive sleep apnea [Snoring] : snoring [Lab] : lab [TextBox_4] : [facility]  used [id number]\par Patient is a 55 year old Female with PMH of HTN, H. Pylori with confirmed Eradication coming to the pulmonology clinic for follow up regarding clearance for Bariatric Surgery as well as follow up from the sleep study she had done in October 2019. \par The patient reports feeling well overall. Her biggest concern seems to be her weight. She would really like to have the bariatric surgery done. She reports that she has tried lifestyle changes to no avail. She was originally seeing a bariatric surgeon, but has since lost to follow up but would like to be evaluated for the surgery. Patient reports that the obesity causes her to be depressed a lot and she loses sleep over it. \par \par Patient reports she doesn’t have excess tiredness throughout the day, and only very limited time does she fall asleep during the day. She reports that she snores a lot. She reports feeling refreshed on awakeneing.\par Patient reports some SOB when climbing up stairs, but is able to Walk 5/6 blocks with no problem. Patient reports she does get some Tachycardia when she is surprised and it goes away on its own, but never when she is not active. \par \par Discussed with the patient her results from the sleep study and her Severe SEMAJ with many episodes of awakening and Hypopnea/apnea and  minimal sleep and REM sleep. She expressed understandings and willingness to comply with treatment. \par \par It was also explained to her that the Bariatric teram dropped her from the program due to perceived difficulties with compliance to the program,. She was advised to follow up with them in regards to rentering the program  [TextBox_100] : 11/19

## 2020-06-12 NOTE — REVIEW OF SYSTEMS
[SOB on Exertion] : sob on exertion [Palpitations] : palpitations [GERD] : gerd [Obesity] : obesity [Fever] : no fever [Fatigue] : no fatigue [Chills] : no chills [Cough] : no cough [Dyspnea] : no dyspnea [Chest Discomfort] : no chest discomfort [Syncope] : no syncope [Nausea] : no nausea [Vomiting] : no vomiting [Diarrhea] : no diarrhea [Constipation] : no constipation [Dysphagia] : no dysphagia [Dysuria] : no dysuria [Frequency] : no frequency [Focal Weakness] : no focal weakness [Urgency] : no urgency [Headache] : no headache [Dizziness] : no dizziness [Numbness] : no numbness [Diabetes] : no diabetes [TextBox_30] : Slight chonic throat irritaion worse in cold [TextBox_14] : difficulty seeing Close, and blurry far [TextBox_44] : Feels fast heart rate when surprised [TextBox_137] : Slightly depressiv emood

## 2020-06-12 NOTE — PHYSICAL EXAM
[No Acute Distress] : no acute distress [Enlarged Base of the Tongue] : enlarged base of the tongue [Well Nourished] : well nourished [Normal Rate/Rhythm] : normal rate/rhythm [Normal Pulses] : normal pulses [Normal S1, S2] : normal s1, s2 [No Murmurs] : no murmurs [No Resp Distress] : no resp distress [Normal Rhythm and Effort] : normal rhythm and effort [Clear to Auscultation Bilaterally] : clear to auscultation bilaterally [No Abnormalities] : no abnormalities [Not Tender] : not tender [No Masses] : no masses [Soft] : soft [Normal Bowel Sounds] : normal bowel sounds [No Edema] : no edema [Normal Color/ Pigmentation] : normal color/ pigmentation [No Focal Deficits] : no focal deficits [No Motor Deficits] : no motor deficits [Oriented x3] : oriented x3 [Normal Mood] : normal mood [Normal Affect] : normal affect [TextBox_44] : Large neck [TextBox_89] : Obesity

## 2020-06-18 DIAGNOSIS — G47.33 OBSTRUCTIVE SLEEP APNEA (ADULT) (PEDIATRIC): ICD-10-CM

## 2020-06-18 DIAGNOSIS — K21.9 GASTRO-ESOPHAGEAL REFLUX DISEASE WITHOUT ESOPHAGITIS: ICD-10-CM

## 2020-06-18 DIAGNOSIS — E66.01 MORBID (SEVERE) OBESITY DUE TO EXCESS CALORIES: ICD-10-CM

## 2020-06-18 DIAGNOSIS — I10 ESSENTIAL (PRIMARY) HYPERTENSION: ICD-10-CM

## 2020-11-02 ENCOUNTER — RX RENEWAL (OUTPATIENT)
Age: 55
End: 2020-11-02

## 2020-11-06 ENCOUNTER — APPOINTMENT (OUTPATIENT)
Dept: PULMONOLOGY | Facility: CLINIC | Age: 55
End: 2020-11-06
Payer: MEDICAID

## 2020-11-06 ENCOUNTER — OUTPATIENT (OUTPATIENT)
Dept: OUTPATIENT SERVICES | Facility: HOSPITAL | Age: 55
LOS: 1 days | Discharge: HOME | End: 2020-11-06

## 2020-11-06 VITALS
HEIGHT: 61 IN | SYSTOLIC BLOOD PRESSURE: 132 MMHG | HEART RATE: 68 BPM | TEMPERATURE: 97.9 F | BODY MASS INDEX: 50.41 KG/M2 | WEIGHT: 267 LBS | OXYGEN SATURATION: 98 % | DIASTOLIC BLOOD PRESSURE: 82 MMHG

## 2020-11-06 PROCEDURE — 99213 OFFICE O/P EST LOW 20 MIN: CPT | Mod: GC

## 2020-11-06 NOTE — HISTORY OF PRESENT ILLNESS
[TextBox_4] : Patient is a 56 y/o woman with PMH of SEMAJ, HTN who presents for routine follow-up. Patient has not been using not using CPAP for 1.5 months states the mask is too big; has new mask ordered. Counseled on importance of use; states she will use it. \par \par \par \par

## 2020-11-06 NOTE — ASSESSMENT
[FreeTextEntry1] : Patient is a 54 y/o woman with PMH of SEMAJ, HTN who presents for routine follow-up. \par \par 1) SEMAJ\par -Patient had not been using CPAP; counseled on using. Will follow-up in 6 months\par \par 2) Bariatric surgery\par -Patient will make appointment with bariatric surgery team in coming month

## 2020-11-06 NOTE — PHYSICAL EXAM
[No Acute Distress] : no acute distress [Normal Rate/Rhythm] : normal rate/rhythm [Normal S1, S2] : normal s1, s2 [No Resp Distress] : no resp distress

## 2020-12-11 ENCOUNTER — EMERGENCY (EMERGENCY)
Facility: HOSPITAL | Age: 55
LOS: 0 days | Discharge: HOME | End: 2020-12-11
Attending: EMERGENCY MEDICINE | Admitting: EMERGENCY MEDICINE
Payer: MEDICAID

## 2020-12-11 VITALS
HEART RATE: 70 BPM | OXYGEN SATURATION: 96 % | RESPIRATION RATE: 19 BRPM | DIASTOLIC BLOOD PRESSURE: 77 MMHG | SYSTOLIC BLOOD PRESSURE: 127 MMHG | TEMPERATURE: 99 F

## 2020-12-11 DIAGNOSIS — R11.2 NAUSEA WITH VOMITING, UNSPECIFIED: ICD-10-CM

## 2020-12-11 DIAGNOSIS — R19.7 DIARRHEA, UNSPECIFIED: ICD-10-CM

## 2020-12-11 DIAGNOSIS — R51.9 HEADACHE, UNSPECIFIED: ICD-10-CM

## 2020-12-11 DIAGNOSIS — R50.9 FEVER, UNSPECIFIED: ICD-10-CM

## 2020-12-11 DIAGNOSIS — Z20.828 CONTACT WITH AND (SUSPECTED) EXPOSURE TO OTHER VIRAL COMMUNICABLE DISEASES: ICD-10-CM

## 2020-12-11 DIAGNOSIS — Z90.710 ACQUIRED ABSENCE OF BOTH CERVIX AND UTERUS: Chronic | ICD-10-CM

## 2020-12-11 DIAGNOSIS — I10 ESSENTIAL (PRIMARY) HYPERTENSION: ICD-10-CM

## 2020-12-11 LAB
ALBUMIN SERPL ELPH-MCNC: 3.7 G/DL — SIGNIFICANT CHANGE UP (ref 3.5–5.2)
ALP SERPL-CCNC: 92 U/L — SIGNIFICANT CHANGE UP (ref 30–115)
ALT FLD-CCNC: 13 U/L — SIGNIFICANT CHANGE UP (ref 0–41)
ANION GAP SERPL CALC-SCNC: 10 MMOL/L — SIGNIFICANT CHANGE UP (ref 7–14)
AST SERPL-CCNC: 24 U/L — SIGNIFICANT CHANGE UP (ref 0–41)
BASOPHILS # BLD AUTO: 0.01 K/UL — SIGNIFICANT CHANGE UP (ref 0–0.2)
BASOPHILS NFR BLD AUTO: 0.2 % — SIGNIFICANT CHANGE UP (ref 0–1)
BILIRUB SERPL-MCNC: <0.2 MG/DL — SIGNIFICANT CHANGE UP (ref 0.2–1.2)
BUN SERPL-MCNC: 9 MG/DL — LOW (ref 10–20)
CALCIUM SERPL-MCNC: 8.6 MG/DL — SIGNIFICANT CHANGE UP (ref 8.5–10.1)
CHLORIDE SERPL-SCNC: 105 MMOL/L — SIGNIFICANT CHANGE UP (ref 98–110)
CO2 SERPL-SCNC: 26 MMOL/L — SIGNIFICANT CHANGE UP (ref 17–32)
CREAT SERPL-MCNC: 0.8 MG/DL — SIGNIFICANT CHANGE UP (ref 0.7–1.5)
EOSINOPHIL # BLD AUTO: 0.01 K/UL — SIGNIFICANT CHANGE UP (ref 0–0.7)
EOSINOPHIL NFR BLD AUTO: 0.2 % — SIGNIFICANT CHANGE UP (ref 0–8)
GLUCOSE SERPL-MCNC: 90 MG/DL — SIGNIFICANT CHANGE UP (ref 70–99)
HCT VFR BLD CALC: 42.6 % — SIGNIFICANT CHANGE UP (ref 37–47)
HGB BLD-MCNC: 13.4 G/DL — SIGNIFICANT CHANGE UP (ref 12–16)
IMM GRANULOCYTES NFR BLD AUTO: 0.2 % — SIGNIFICANT CHANGE UP (ref 0.1–0.3)
LYMPHOCYTES # BLD AUTO: 1.28 K/UL — SIGNIFICANT CHANGE UP (ref 1.2–3.4)
LYMPHOCYTES # BLD AUTO: 24.5 % — SIGNIFICANT CHANGE UP (ref 20.5–51.1)
MCHC RBC-ENTMCNC: 22.9 PG — LOW (ref 27–31)
MCHC RBC-ENTMCNC: 31.5 G/DL — LOW (ref 32–37)
MCV RBC AUTO: 72.7 FL — LOW (ref 81–99)
MONOCYTES # BLD AUTO: 0.57 K/UL — SIGNIFICANT CHANGE UP (ref 0.1–0.6)
MONOCYTES NFR BLD AUTO: 10.9 % — HIGH (ref 1.7–9.3)
NEUTROPHILS # BLD AUTO: 3.34 K/UL — SIGNIFICANT CHANGE UP (ref 1.4–6.5)
NEUTROPHILS NFR BLD AUTO: 64 % — SIGNIFICANT CHANGE UP (ref 42.2–75.2)
NRBC # BLD: 0 /100 WBCS — SIGNIFICANT CHANGE UP (ref 0–0)
PLATELET # BLD AUTO: 256 K/UL — SIGNIFICANT CHANGE UP (ref 130–400)
POTASSIUM SERPL-MCNC: 4.5 MMOL/L — SIGNIFICANT CHANGE UP (ref 3.5–5)
POTASSIUM SERPL-SCNC: 4.5 MMOL/L — SIGNIFICANT CHANGE UP (ref 3.5–5)
PROT SERPL-MCNC: 7 G/DL — SIGNIFICANT CHANGE UP (ref 6–8)
RBC # BLD: 5.86 M/UL — HIGH (ref 4.2–5.4)
RBC # FLD: 15 % — HIGH (ref 11.5–14.5)
SODIUM SERPL-SCNC: 141 MMOL/L — SIGNIFICANT CHANGE UP (ref 135–146)
WBC # BLD: 5.22 K/UL — SIGNIFICANT CHANGE UP (ref 4.8–10.8)
WBC # FLD AUTO: 5.22 K/UL — SIGNIFICANT CHANGE UP (ref 4.8–10.8)

## 2020-12-11 PROCEDURE — 99284 EMERGENCY DEPT VISIT MOD MDM: CPT

## 2020-12-11 RX ORDER — ACETAMINOPHEN 500 MG
1000 TABLET ORAL ONCE
Refills: 0 | Status: COMPLETED | OUTPATIENT
Start: 2020-12-11 | End: 2020-12-11

## 2020-12-11 RX ORDER — SODIUM CHLORIDE 9 MG/ML
1000 INJECTION, SOLUTION INTRAVENOUS ONCE
Refills: 0 | Status: COMPLETED | OUTPATIENT
Start: 2020-12-11 | End: 2020-12-11

## 2020-12-11 RX ORDER — ACETAMINOPHEN 500 MG
975 TABLET ORAL ONCE
Refills: 0 | Status: DISCONTINUED | OUTPATIENT
Start: 2020-12-11 | End: 2020-12-11

## 2020-12-11 RX ADMIN — SODIUM CHLORIDE 1000 MILLILITER(S): 9 INJECTION, SOLUTION INTRAVENOUS at 10:19

## 2020-12-11 NOTE — ED PROVIDER NOTE - PHYSICAL EXAMINATION
PHYSICAL EXAM:  GENERAL: NAD, lying in bed comfortably  HEAD:  Atraumatic, Normocephalic  EYES: EOMI, PERRLA, conjunctiva and sclera clear  ENT: Moist mucous membranes, no erythema or exudates seen  NECK: Supple, No JVD  CHEST/LUNG: Clear to auscultation bilaterally; No rales, rhonchi, wheezing, or rubs. Unlabored respirations  HEART: Regular rate and rhythm; No murmurs, rubs, or gallops  ABDOMEN: Bowel sounds present; Soft, Nontender, Nondistended. No masses palpated  EXTREMITIES:  2+ Peripheral Pulses, brisk capillary refill. No clubbing, cyanosis, or edema  NERVOUS SYSTEM:  Alert & Oriented x3, speech clear. No deficits   MSK: FROM all 4 extremities, full and equal strength  SKIN: No rashes or lesions

## 2020-12-11 NOTE — ED PROVIDER NOTE - PATIENT PORTAL LINK FT
You can access the FollowMyHealth Patient Portal offered by  by registering at the following website: [email or web address]. By joining [facility]’s FollowMyHealth portal, you will also be able to view your health information using other applications (apps) compatible with our system.

## 2020-12-11 NOTE — ED PROVIDER NOTE - NSFOLLOWUPINSTRUCTIONS_ED_ALL_ED_FT
Please follow-up with your PMD. Return to the ED if you have chest pain, shortness of breath, worsening fever.

## 2020-12-11 NOTE — ED PROVIDER NOTE - OBJECTIVE STATEMENT
54yo female h/o HTN, presents to the ED complaining of fever, chills, HA, nausea, vomiting, and diarrhea for 3-4 days. She states she has not been able to eat or drink much and is feeling dehydrated. Denies chest pain, cough, or SOB. Denies travel or sick contacts. Patient has not taken her temperature but has been feeling feverish.

## 2020-12-11 NOTE — ED PROVIDER NOTE - NS ED ROS FT
REVIEW OF SYSTEMS:    CONSTITUTIONAL: c/o fever, chills, dehydration  EYES/ENT: Throat pain; No visual changes;  No vertigo;  NECK: No pain or stiffness  RESPIRATORY: No cough, wheezing, hemoptysis; No shortness of breath  CARDIOVASCULAR: No chest pain or palpitations  GASTROINTESTINAL: Nausea, vomiting, diarrhea; No abdominal or epigastric pain. No hematemesis; No constipation. No melena or hematochezia.  GENITOURINARY: No dysuria, frequency or hematuria  NEUROLOGICAL: No numbness or weakness  SKIN: No itching, rashes

## 2020-12-11 NOTE — ED PROVIDER NOTE - CARE PROVIDER_API CALL
Yony Hair  CRITICAL CARE MEDICINE  [address] [place] [address], [address] [address]  Phone: [phone]  Fax: [phone]  Established Patient  Follow Up Time:

## 2020-12-11 NOTE — ED PROVIDER NOTE - PROGRESS NOTE DETAILS
Patient being given zofran, tylenol and bolus of LR ATTENDING NOTE: I personally evaluated the patient. I reviewed the Resident’s note (as assigned above), and agree with the findings and plan except as documented in my note.   56 y/o F with PMH of HTN, however, has not had BP meds since Oct/Nov, unsure of date, here with n/v/d, chills, and mild frontal HA. No neck pain or stiffness, CP, SOB, or URI SX.   On exam: pt awake and alert, in no distress. Chest clear, abdomen soft NTND, no edema. Due to SX pt having difficulty eating/drinking. Will check labs, likely DC.

## 2020-12-12 LAB — SARS-COV-2 RNA SPEC QL NAA+PROBE: DETECTED

## 2021-01-23 ENCOUNTER — APPOINTMENT (OUTPATIENT)
Dept: INTERNAL MEDICINE | Facility: CLINIC | Age: 56
End: 2021-01-23

## 2021-01-29 ENCOUNTER — OUTPATIENT (OUTPATIENT)
Dept: OUTPATIENT SERVICES | Facility: HOSPITAL | Age: 56
LOS: 1 days | Discharge: HOME | End: 2021-01-29

## 2021-01-29 ENCOUNTER — APPOINTMENT (OUTPATIENT)
Dept: INTERNAL MEDICINE | Facility: CLINIC | Age: 56
End: 2021-01-29
Payer: MEDICAID

## 2021-01-29 VITALS
OXYGEN SATURATION: 99 % | SYSTOLIC BLOOD PRESSURE: 109 MMHG | HEART RATE: 128 BPM | TEMPERATURE: 96.6 F | WEIGHT: 268 LBS | DIASTOLIC BLOOD PRESSURE: 75 MMHG | HEIGHT: 61 IN | BODY MASS INDEX: 50.6 KG/M2

## 2021-01-29 VITALS — DIASTOLIC BLOOD PRESSURE: 81 MMHG | SYSTOLIC BLOOD PRESSURE: 131 MMHG

## 2021-01-29 DIAGNOSIS — Z00.00 ENCOUNTER FOR GENERAL ADULT MEDICAL EXAMINATION W/OUT ABNORMAL FINDINGS: ICD-10-CM

## 2021-01-29 DIAGNOSIS — Z90.710 ACQUIRED ABSENCE OF BOTH CERVIX AND UTERUS: Chronic | ICD-10-CM

## 2021-01-29 PROCEDURE — 99212 OFFICE O/P EST SF 10 MIN: CPT | Mod: GC

## 2021-01-29 RX ORDER — LISINOPRIL 20 MG/1
20 TABLET ORAL
Refills: 0 | Status: DISCONTINUED | COMMUNITY
End: 2021-01-29

## 2021-02-11 DIAGNOSIS — Z00.00 ENCOUNTER FOR GENERAL ADULT MEDICAL EXAMINATION WITHOUT ABNORMAL FINDINGS: ICD-10-CM
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
## 2021-02-11 DIAGNOSIS — I10 ESSENTIAL (PRIMARY) HYPERTENSION: ICD-10-CM

## 2021-02-11 DIAGNOSIS — G47.33 OBSTRUCTIVE SLEEP APNEA (ADULT) (PEDIATRIC): ICD-10-CM

## 2021-02-11 DIAGNOSIS — R52 PAIN, UNSPECIFIED: ICD-10-CM

## 2021-02-11 DIAGNOSIS — K21.9 GASTRO-ESOPHAGEAL REFLUX DISEASE WITHOUT ESOPHAGITIS: ICD-10-CM

## 2021-02-11 NOTE — PHYSICAL EXAM
[No Acute Distress] : no acute distress [Well Nourished] : well nourished [Well Developed] : well developed [No Respiratory Distress] : no respiratory distress  [No Accessory Muscle Use] : no accessory muscle use [Clear to Auscultation] : lungs were clear to auscultation bilaterally [Normal Rate] : normal rate  [Regular Rhythm] : with a regular rhythm [No Murmur] : no murmur heard [Soft] : abdomen soft [Non Tender] : non-tender [Non-distended] : non-distended [Normal Bowel Sounds] : normal bowel sounds [Normal Affect] : the affect was normal [Normal Mood] : the mood was normal [Normal Insight/Judgement] : insight and judgment were intact [de-identified] : Abdominal Obesity

## 2021-02-11 NOTE — HISTORY OF PRESENT ILLNESS
[FreeTextEntry1] : [id number] [FreeTextEntry2] : Jamil [TWNoteComboBox1] : [place]cu[place] [de-identified] : Patient is a 54 y/o female with medical hx of Essential HTN, SEMAJ on CPAP, GERD with H. pylori gastritis now eradicated, presenting to the clinic to Establish care. Patient's prior PCP left the practice. Pt sates she fell on the stairs on Monday and having multiple complaints of pain( Back, knee, ankles and shoulder) , will refer her to Urgent care. No other complaints

## 2021-02-11 NOTE — ASSESSMENT
[FreeTextEntry1] : Patient is a 56 y/o female with medical hx of Essential HTN, SEMAJ on CPAP, GERD with H. pylori gastritis now eradicated, presenting to the clinic to Establish care\par \par S/P Mechanical fall\par -Pain in Right shoulder, b/l Knee and ankles and Lower back\par -Meloxicam 15mg for 10days\par -Referred to Urgent care for Xrays \par \par Essential HTN: well controlled \par -BP Today: 109/75 (small cuff), 138/107 with appropriate cuff size\par -c/w Valsartan and Amlodipine \par \par GERD Gastritis: c/w PPI \par SEMAJ on CPAP\par \par HCM\par -Flu shot given today\par -Reffered for PAP + HPV, Nolvia and Colonoscopy \par -Routine Labs ordered, RTC in 2 weeks \par \par

## 2021-02-11 NOTE — REVIEW OF SYSTEMS
[Joint Pain] : joint pain [Back Pain] : back pain [Fever] : no fever [Chills] : no chills [Fatigue] : no fatigue [Chest Pain] : no chest pain [Palpitations] : no palpitations [Orthopnea] : no orthopnea [Shortness Of Breath] : no shortness of breath [Wheezing] : no wheezing [Cough] : no cough [Dyspnea on Exertion] : no dyspnea on exertion [Abdominal Pain] : no abdominal pain [Nausea] : no nausea [Constipation] : no constipation [Vomiting] : no vomiting [Dysuria] : no dysuria [Nocturia] : no nocturia [Hematuria] : no hematuria [Frequency] : no frequency [Headache] : no headache [Dizziness] : no dizziness [Fainting] : no fainting [Suicidal] : not suicidal [Anxiety] : no anxiety

## 2021-03-02 ENCOUNTER — NON-APPOINTMENT (OUTPATIENT)
Age: 56
End: 2021-03-02

## 2021-03-03 RX ORDER — PANTOPRAZOLE 40 MG/1
40 TABLET, DELAYED RELEASE ORAL
Qty: 30 | Refills: 3 | Status: DISCONTINUED | COMMUNITY
Start: 2018-07-13 | End: 2021-03-03

## 2021-03-14 LAB
25(OH)D3 SERPL-MCNC: 17 NG/ML
ALBUMIN SERPL ELPH-MCNC: 4.1 G/DL
ALP BLD-CCNC: 104 U/L
ALT SERPL-CCNC: 10 U/L
ANION GAP SERPL CALC-SCNC: 12 MMOL/L
AST SERPL-CCNC: 15 U/L
BASOPHILS # BLD AUTO: 0.06 K/UL
BASOPHILS NFR BLD AUTO: 0.8 %
BILIRUB SERPL-MCNC: 0.3 MG/DL
BUN SERPL-MCNC: 16 MG/DL
CALCIUM SERPL-MCNC: 9.6 MG/DL
CHLORIDE SERPL-SCNC: 100 MMOL/L
CHOLEST SERPL-MCNC: 181 MG/DL
CO2 SERPL-SCNC: 28 MMOL/L
CREAT SERPL-MCNC: 0.8 MG/DL
CREAT SPEC-SCNC: 106 MG/DL
EOSINOPHIL # BLD AUTO: 0.19 K/UL
EOSINOPHIL NFR BLD AUTO: 2.5 %
ESTIMATED AVERAGE GLUCOSE: 111 MG/DL
GLUCOSE SERPL-MCNC: 70 MG/DL
HBA1C MFR BLD HPLC: 5.5 %
HCT VFR BLD CALC: 40.7 %
HDLC SERPL-MCNC: 61 MG/DL
HGB BLD-MCNC: 12.6 G/DL
IMM GRANULOCYTES NFR BLD AUTO: 0.4 %
LDLC SERPL CALC-MCNC: 110 MG/DL
LYMPHOCYTES # BLD AUTO: 2.61 K/UL
LYMPHOCYTES NFR BLD AUTO: 33.9 %
MAN DIFF?: NORMAL
MCHC RBC-ENTMCNC: 23.5 PG
MCHC RBC-ENTMCNC: 31 G/DL
MCV RBC AUTO: 75.8 FL
MICROALBUMIN 24H UR DL<=1MG/L-MCNC: <1.2 MG/DL
MICROALBUMIN/CREAT 24H UR-RTO: NORMAL MG/G
MONOCYTES # BLD AUTO: 0.61 K/UL
MONOCYTES NFR BLD AUTO: 7.9 %
NEUTROPHILS # BLD AUTO: 4.19 K/UL
NEUTROPHILS NFR BLD AUTO: 54.5 %
NONHDLC SERPL-MCNC: 120 MG/DL
PLATELET # BLD AUTO: 352 K/UL
POTASSIUM SERPL-SCNC: 4.8 MMOL/L
PROT SERPL-MCNC: 7.4 G/DL
RBC # BLD: 5.37 M/UL
RBC # FLD: 16.7 %
SODIUM SERPL-SCNC: 140 MMOL/L
TRIGL SERPL-MCNC: 65 MG/DL
TSH SERPL-ACNC: 1.41 UIU/ML
WBC # FLD AUTO: 7.69 K/UL

## 2021-04-01 ENCOUNTER — APPOINTMENT (OUTPATIENT)
Dept: OBGYN | Facility: CLINIC | Age: 56
End: 2021-04-01

## 2021-04-23 ENCOUNTER — APPOINTMENT (OUTPATIENT)
Dept: INTERNAL MEDICINE | Facility: CLINIC | Age: 56
End: 2021-04-23
Payer: MEDICAID

## 2021-04-23 ENCOUNTER — OUTPATIENT (OUTPATIENT)
Dept: OUTPATIENT SERVICES | Facility: HOSPITAL | Age: 56
LOS: 1 days | Discharge: HOME | End: 2021-04-23

## 2021-04-23 VITALS
SYSTOLIC BLOOD PRESSURE: 154 MMHG | BODY MASS INDEX: 50.22 KG/M2 | TEMPERATURE: 97.8 F | WEIGHT: 266 LBS | HEIGHT: 61 IN | OXYGEN SATURATION: 98 % | HEART RATE: 62 BPM | DIASTOLIC BLOOD PRESSURE: 84 MMHG

## 2021-04-23 DIAGNOSIS — Z90.710 ACQUIRED ABSENCE OF BOTH CERVIX AND UTERUS: Chronic | ICD-10-CM

## 2021-04-23 DIAGNOSIS — M25.50 PAIN IN UNSPECIFIED JOINT: ICD-10-CM

## 2021-04-23 DIAGNOSIS — R52 PAIN, UNSPECIFIED: ICD-10-CM

## 2021-04-23 DIAGNOSIS — H53.8 OTHER VISUAL DISTURBANCES: ICD-10-CM

## 2021-04-23 PROCEDURE — 99213 OFFICE O/P EST LOW 20 MIN: CPT | Mod: GC

## 2021-04-23 RX ORDER — MELOXICAM 15 MG/1
15 TABLET ORAL DAILY
Qty: 10 | Refills: 0 | Status: DISCONTINUED | COMMUNITY
Start: 2021-01-29 | End: 2021-04-23

## 2021-04-23 NOTE — HISTORY OF PRESENT ILLNESS
[FreeTextEntry1] : fu [de-identified] : Patient is a 56 y/o female with medical hx of Essential HTN, SEMAJ on CPAP, GERD with H. pylori gastritis now eradicated, presenting to the clinic for f/u. Patient reports no complaint now. she has LLE especially at the end of her day. she is obese. she says bp at home is controlled. she did blood work in jan which was within normal limits. she wants meds refills

## 2021-04-23 NOTE — ASSESSMENT
[FreeTextEntry1] : Patient is a 56 y/o female with medical hx of Essential HTN, SEMAJ on CPAP, GERD with H. pylori gastritis now eradicated, presenting to the clinic for fu\par \par \par Essential HTN: well controlled \par -BP Today: 150/90. Patient said she needs refills for her bp meds\par -c/w Valsartan and Amlodipine \par - fu in 3 months\par - CMP normal\par \par GERD Gastritis: c/w PPI \par - GI referral for EGD/[place]\par \par SEMAJ on CPAP\par - pulm referral\par \par osteoarthritis\par - tylenol PRN\par - Knee and back xray\par \par Vitamin D def\par - supplement\par \par HCM\par -Flu shot uptodate\par - Patient wants dental and ophtalmo referral\par -Referred for GYN for pap/mammogram ordered\par -Routine Labs reviewed. fu in 3 months

## 2021-04-28 DIAGNOSIS — R52 PAIN, UNSPECIFIED: ICD-10-CM

## 2021-04-28 DIAGNOSIS — I10 ESSENTIAL (PRIMARY) HYPERTENSION: ICD-10-CM

## 2021-04-28 DIAGNOSIS — M25.50 PAIN IN UNSPECIFIED JOINT: ICD-10-CM

## 2021-05-14 ENCOUNTER — OUTPATIENT (OUTPATIENT)
Dept: OUTPATIENT SERVICES | Facility: HOSPITAL | Age: 56
LOS: 1 days | Discharge: HOME | End: 2021-05-14

## 2021-05-14 ENCOUNTER — APPOINTMENT (OUTPATIENT)
Dept: GASTROENTEROLOGY | Facility: CLINIC | Age: 56
End: 2021-05-14
Payer: MEDICAID

## 2021-05-14 VITALS
OXYGEN SATURATION: 98 % | DIASTOLIC BLOOD PRESSURE: 68 MMHG | SYSTOLIC BLOOD PRESSURE: 120 MMHG | BODY MASS INDEX: 50.22 KG/M2 | WEIGHT: 266 LBS | HEART RATE: 61 BPM | HEIGHT: 61 IN | TEMPERATURE: 96.5 F

## 2021-05-14 DIAGNOSIS — K21.9 GASTRO-ESOPHAGEAL REFLUX DISEASE W/OUT ESOPHAGITIS: ICD-10-CM

## 2021-05-14 DIAGNOSIS — Z90.710 ACQUIRED ABSENCE OF BOTH CERVIX AND UTERUS: Chronic | ICD-10-CM

## 2021-05-14 DIAGNOSIS — J39.2 OTHER DISEASES OF PHARYNX: ICD-10-CM

## 2021-05-14 DIAGNOSIS — Z12.11 ENCOUNTER FOR SCREENING FOR MALIGNANT NEOPLASM OF COLON: ICD-10-CM

## 2021-05-14 DIAGNOSIS — R13.12 DYSPHAGIA, OROPHARYNGEAL PHASE: ICD-10-CM

## 2021-05-14 PROCEDURE — 99214 OFFICE O/P EST MOD 30 MIN: CPT | Mod: GC

## 2021-05-14 NOTE — REVIEW OF SYSTEMS
[Heartburn] : heartburn [Fever] : no fever [Feeling Poorly] : not feeling poorly [Feeling Tired] : not feeling tired [Recent Weight Gain (___ Lbs)] : no recent weight gain [Recent Weight Loss (___ Lbs)] : no recent weight loss [Eye Pain] : no eye pain [Earache] : no earache [Chest Pain] : no chest pain [Palpitations] : no palpitations [Abdominal Pain] : no abdominal pain [Vomiting] : no vomiting [Constipation] : no constipation [Diarrhea] : no diarrhea [Melena] : no melena [Confused] : no confusion

## 2021-05-14 NOTE — END OF VISIT
[] : Fellow [FreeTextEntry3] : colonoscopy - will schedule after pulmonary clearance given pt has not been on CPAP for many months. oropharyngeal dysphagia - refer back to ENT, speech and swallow eval.

## 2021-05-14 NOTE — PHYSICAL EXAM
[General Appearance - Alert] : alert [General Appearance - Well Nourished] : well nourished [General Appearance - Well Developed] : well developed [General Appearance - Well-Appearing] : healthy appearing [Sclera] : the sclera and conjunctiva were normal [Outer Ear] : the ears and nose were normal in appearance [Exaggerated Use Of Accessory Muscles For Inspiration] : no accessory muscle use [Auscultation Breath Sounds / Voice Sounds] : lungs were clear to auscultation bilaterally [Heart Sounds] : normal S1 and S2 [Heart Sounds Gallop] : no gallops [Bowel Sounds] : normal bowel sounds [Abdomen Soft] : soft [Abdomen Tenderness] : non-tender [] : no hepato-splenomegaly [Abdomen Mass (___ Cm)] : no abdominal mass palpated [Abdomen Hernia] : no hernia was discovered [No CVA Tenderness] : no ~M costovertebral angle tenderness [Skin Color & Pigmentation] : normal skin color and pigmentation [Oriented To Time, Place, And Person] : oriented to person, place, and time

## 2021-05-14 NOTE — HISTORY OF PRESENT ILLNESS
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 , biopsy showed polypoidal fragment of mucous material admixed with detached reactive epithelium , inflammatory cells , necrosis , and bacterial colonies , patient also s/p cervical LN biopsy was negative for carcinoma , but  lymphoproliferative disorder can not be excluded , patient was supposed to follow up with ENT which she did not do , when asked why , she mentioned because she is careless ,Patient complain of intermittent difficulty passing food in the oropharynx and sometimes starts coughing after swallowing ,    Patient has history of GERD , on PPI , mentioned when she stop PPI , she has symptoms again , her symptoms , are controlled with PPI , no pharyngeal dysphagia or odynophagia , no abdominal pain , no nausea or vomiting , no diarrhea or constipation , no fresh blood per rectum , no melena. \par Patient was discharged from bariatric program before  because of concerns of non compliance \par \par last colonoscopy 3 years ago in [facility] , was normal according to patient \par EGD 1/2019 normal mucosa, non erosive gastritis. H. pylori positive. Subepithelial lesion in pharynx and lateral uvula. \par

## 2021-05-14 NOTE — ASSESSMENT
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 Patient is referred to GI for screening colonoscopy. ROS is positive for GERD controlled with PPI \par \par \par #screening colonoscopy:\par patient will require pulmonary clearance ( history of sleep apnea requiring CPAP, not not on CPAP , because the size of the mask is big for her face) \par will schedule her for screening colonoscopy after pulmonary evaluation\par \par #GERD: \par patient symptoms controlled on PPI , recent EGD 2 years ago , no alarm symptoms  , symptoms are recurrent once she stop taking the medication \par REC: continue Protonix 40 mg QD\par patient educated for nelson maintenance with chronic PPI usage( f/u with PMD for bone scan , vitamin D and calcium supplement ) \par \par #oropharyngeal dysphagia : cough when eating \par nasopharyngeal mass s/p biopsy \par REC: \par will refer to ENT for follow up \par speech and swallow evaluation , modified barium swallow \par \par \par

## 2021-05-28 ENCOUNTER — RESULT REVIEW (OUTPATIENT)
Age: 56
End: 2021-05-28

## 2021-05-28 ENCOUNTER — APPOINTMENT (OUTPATIENT)
Dept: OBGYN | Facility: CLINIC | Age: 56
End: 2021-05-28
Payer: MEDICAID

## 2021-05-28 ENCOUNTER — OUTPATIENT (OUTPATIENT)
Dept: OUTPATIENT SERVICES | Facility: HOSPITAL | Age: 56
LOS: 1 days | Discharge: HOME | End: 2021-05-28
Payer: MEDICAID

## 2021-05-28 ENCOUNTER — OUTPATIENT (OUTPATIENT)
Dept: OUTPATIENT SERVICES | Facility: HOSPITAL | Age: 56
LOS: 1 days | Discharge: HOME | End: 2021-05-28

## 2021-05-28 VITALS
DIASTOLIC BLOOD PRESSURE: 50 MMHG | HEIGHT: 61 IN | WEIGHT: 261 LBS | BODY MASS INDEX: 49.28 KG/M2 | SYSTOLIC BLOOD PRESSURE: 100 MMHG

## 2021-05-28 DIAGNOSIS — Z90.710 ACQUIRED ABSENCE OF BOTH CERVIX AND UTERUS: Chronic | ICD-10-CM

## 2021-05-28 DIAGNOSIS — M54.5 LOW BACK PAIN: ICD-10-CM

## 2021-05-28 DIAGNOSIS — Z01.419 ENCOUNTER FOR GYNECOLOGICAL EXAMINATION (GENERAL) (ROUTINE) WITHOUT ABNORMAL FINDINGS: ICD-10-CM

## 2021-05-28 DIAGNOSIS — M25.511 PAIN IN RIGHT SHOULDER: ICD-10-CM

## 2021-05-28 PROCEDURE — 73562 X-RAY EXAM OF KNEE 3: CPT | Mod: 26,50

## 2021-05-28 PROCEDURE — 99386 PREV VISIT NEW AGE 40-64: CPT

## 2021-05-28 PROCEDURE — 73030 X-RAY EXAM OF SHOULDER: CPT | Mod: 26,RT

## 2021-05-28 PROCEDURE — 72110 X-RAY EXAM L-2 SPINE 4/>VWS: CPT | Mod: 26

## 2021-05-28 NOTE — DISCUSSION/SUMMARY
[FreeTextEntry1] : 55yo for annual exam\par -pap/hpv done; discussed with patient that she had a supracervical hysterectomy so she needs to continue pap smears\par -vaginitis panel done\par -discussed recommendation for weight loss; pt previously followed with bariatric surgeron but was discharge from program, wants to try again\par -screening mammo ordered\par -f/u in 1 yr or PRN

## 2021-05-28 NOTE — HISTORY OF PRESENT ILLNESS
[FreeTextEntry1] : 55yo P3 here for annual exam without complaints.  \par LMP when 36 years old at time of hysterectomy for fibroids; pt felt menopausal sx right after surgery\par Not sexually active\par Pt reporting occasional fishy vaginal odor, no discharge\par Reports doing q6mo breast mammo/sono due to calcifications, had a benign biopsy of left breast in the past\par \par PMH Obesity, HTN, GERD\par PSH: hysterectomy in [place] at 37yo, pp BTL\par Meds: amlodipine, valsartan, omeprazole, aspirin, meloxicam\par NKDA\par

## 2021-05-28 NOTE — PHYSICAL EXAM
[Appropriately responsive] : appropriately responsive [Alert] : alert [No Acute Distress] : no acute distress [No Lymphadenopathy] : no lymphadenopathy [Regular Rate Rhythm] : regular rate rhythm [No Murmurs] : no murmurs [Clear to Auscultation B/L] : clear to auscultation bilaterally [Soft] : soft [Non-tender] : non-tender [Non-distended] : non-distended [No HSM] : No HSM [No Lesions] : no lesions [No Mass] : no mass [Oriented x3] : oriented x3 [FreeTextEntry7] : obese, infraumbilican and low transverse scar [Examination Of The Breasts] : a normal appearance [No Masses] : no breast masses were palpable [Vulvar Atrophy] : vulvar atrophy [Labia Majora] : normal [Labia Minora] : normal [Atrophy] : atrophy [Normal] : normal [Absent] : absent [Uterine Adnexae] : normal

## 2021-06-03 LAB — HPV HIGH+LOW RISK DNA PNL CVX: NOT DETECTED

## 2021-06-11 LAB — CYTOLOGY CVX/VAG DOC THIN PREP: NORMAL

## 2021-06-23 ENCOUNTER — RESULT REVIEW (OUTPATIENT)
Age: 56
End: 2021-06-23

## 2021-06-23 ENCOUNTER — OUTPATIENT (OUTPATIENT)
Dept: OUTPATIENT SERVICES | Facility: HOSPITAL | Age: 56
LOS: 1 days | Discharge: HOME | End: 2021-06-23
Payer: MEDICAID

## 2021-06-23 DIAGNOSIS — Z90.710 ACQUIRED ABSENCE OF BOTH CERVIX AND UTERUS: Chronic | ICD-10-CM

## 2021-06-23 DIAGNOSIS — Z12.31 ENCOUNTER FOR SCREENING MAMMOGRAM FOR MALIGNANT NEOPLASM OF BREAST: ICD-10-CM

## 2021-06-23 PROCEDURE — 77063 BREAST TOMOSYNTHESIS BI: CPT | Mod: 26

## 2021-06-23 PROCEDURE — 77067 SCR MAMMO BI INCL CAD: CPT | Mod: 26

## 2021-06-30 ENCOUNTER — APPOINTMENT (OUTPATIENT)
Dept: OTOLARYNGOLOGY | Facility: CLINIC | Age: 56
End: 2021-06-30

## 2021-07-14 ENCOUNTER — APPOINTMENT (OUTPATIENT)
Dept: OTOLARYNGOLOGY | Facility: CLINIC | Age: 56
End: 2021-07-14

## 2021-08-01 PROCEDURE — G9005: CPT

## 2021-08-23 ENCOUNTER — NON-APPOINTMENT (OUTPATIENT)
Age: 56
End: 2021-08-23

## 2021-09-18 ENCOUNTER — EMERGENCY (EMERGENCY)
Facility: HOSPITAL | Age: 56
LOS: 0 days | Discharge: HOME | End: 2021-09-18
Attending: EMERGENCY MEDICINE | Admitting: EMERGENCY MEDICINE
Payer: MEDICAID

## 2021-09-18 VITALS
HEART RATE: 71 BPM | RESPIRATION RATE: 18 BRPM | SYSTOLIC BLOOD PRESSURE: 131 MMHG | DIASTOLIC BLOOD PRESSURE: 73 MMHG | TEMPERATURE: 97 F | OXYGEN SATURATION: 100 %

## 2021-09-18 VITALS — HEIGHT: 61 IN | WEIGHT: 265.88 LBS

## 2021-09-18 DIAGNOSIS — R30.0 DYSURIA: ICD-10-CM

## 2021-09-18 DIAGNOSIS — M54.5 LOW BACK PAIN: ICD-10-CM

## 2021-09-18 DIAGNOSIS — I10 ESSENTIAL (PRIMARY) HYPERTENSION: ICD-10-CM

## 2021-09-18 DIAGNOSIS — Z90.710 ACQUIRED ABSENCE OF BOTH CERVIX AND UTERUS: Chronic | ICD-10-CM

## 2021-09-18 DIAGNOSIS — N76.0 ACUTE VAGINITIS: ICD-10-CM

## 2021-09-18 DIAGNOSIS — Z90.89 ACQUIRED ABSENCE OF OTHER ORGANS: ICD-10-CM

## 2021-09-18 LAB
APPEARANCE UR: CLEAR — SIGNIFICANT CHANGE UP
BILIRUB UR-MCNC: NEGATIVE — SIGNIFICANT CHANGE UP
COLOR SPEC: SIGNIFICANT CHANGE UP
DIFF PNL FLD: NEGATIVE — SIGNIFICANT CHANGE UP
GLUCOSE UR QL: NEGATIVE — SIGNIFICANT CHANGE UP
KETONES UR-MCNC: NEGATIVE — SIGNIFICANT CHANGE UP
LEUKOCYTE ESTERASE UR-ACNC: NEGATIVE — SIGNIFICANT CHANGE UP
NITRITE UR-MCNC: NEGATIVE — SIGNIFICANT CHANGE UP
PH UR: 6 — SIGNIFICANT CHANGE UP (ref 5–8)
PROT UR-MCNC: NEGATIVE — SIGNIFICANT CHANGE UP
SP GR SPEC: 1.01 — SIGNIFICANT CHANGE UP (ref 1.01–1.03)
UROBILINOGEN FLD QL: SIGNIFICANT CHANGE UP

## 2021-09-18 PROCEDURE — 99284 EMERGENCY DEPT VISIT MOD MDM: CPT

## 2021-09-18 RX ORDER — METRONIDAZOLE 500 MG
1 TABLET ORAL
Qty: 14 | Refills: 0
Start: 2021-09-18 | End: 2021-09-24

## 2021-09-18 RX ORDER — METHOCARBAMOL 500 MG/1
2 TABLET, FILM COATED ORAL
Qty: 28 | Refills: 0
Start: 2021-09-18 | End: 2021-09-24

## 2021-09-18 NOTE — ED PROVIDER NOTE - PHYSICAL EXAMINATION
CONSTITUTIONAL: Well-appearing; well-nourished; in no apparent distress.   GI/:  non-distended; non-tender; no palpable organomegaly. pelvic with : +white malodorous dc c/w BV, no CMT  MS: No evidence of trauma or deformity.  No CVA tenderness. Normal ROM in all four extremities; non-tender to palpation; distal pulses are normal.   SKIN: Normal for age and race; warm; dry; good turgor; no apparent lesions or exudate.   NEURO/PSYCH: A & O x 4; grossly unremarkable. mood and manner are appropriate.

## 2021-09-18 NOTE — ED PROVIDER NOTE - ATTENDING CONTRIBUTION TO CARE
55 yo f with pmh of htn, presents with dysuria.  pt says has been about 2-3 days.  pt also with lower back pain.  no n/v/d, no fever, no chills.  denies vaginal discharge or bleeding.  exam: nad, ncat, perrl, eomi, mmm, rrr, ctab, abd soft, nt,nd aox3, imp: pt with dysuria, check ua

## 2021-09-18 NOTE — ED PROVIDER NOTE - CLINICAL SUMMARY MEDICAL DECISION MAKING FREE TEXT BOX
Pt with back pain and dysuria, ua neg, vaginitis on exam and back pain appears msk.  symptomatic tx.  pt to f/u with gyn

## 2021-09-18 NOTE — ED PROVIDER NOTE - PATIENT PORTAL LINK FT
You can access the FollowMyHealth Patient Portal offered by [facility] by registering at the following website: [email or web address]. By joining [facility]’s FollowMyHealth portal, you will also be able to view your health information using other applications (apps) compatible with our system.

## 2021-09-18 NOTE — ED PROVIDER NOTE - PROGRESS NOTE DETAILS
ua neg, will do pelvic exam c/w mechanical back pain.  no neuro red flags.  sx are reproducible with position. aorta/cardiac not supported.  imaging not indicated at this time.  r/b of antinflamm dw pt.    r/b of narcotic meds dw pt    Patient cautioned against drinking alcohol when taking metronidazole due to possible disulfiram reaction.  The typical disulfiram reaction causes flushing, tachycardia, palpitations, nausea, and vomiting.

## 2021-09-18 NOTE — ED PROVIDER NOTE - OBJECTIVE STATEMENT
pt with pmhx obesity presents to ED c/o dysuria for 1 day. also reports lower back pain for 8 days, worse as HHA. some relief with motrin. pain is sharp, nonradiating, moderate. Denies radiation pain/incontinence/numbness/saddle parathesia/legs weakness and difficulty on ambulation. denies fever/chill/HA/dizziness/chest pain/sob/abd pain/n/v/d

## 2021-09-18 NOTE — ED PROVIDER NOTE - NS ED ROS FT
Constitutional: no fever, chills, no recent weight loss, change in appetite or malaise  Eyes: no redness/discharge/pain/vision changes  ENT: no rhinorrhea/ear pain/sore throat  Cardiac: No chest pain, SOB or edema.  Respiratory: No cough or respiratory distress  GI: No nausea, vomiting, diarrhea or abdominal pain.  : No frequency, urgency or hematuria  MS: no pain to extremities, no loss of ROM, no weakness  Neuro: No headache or weakness. No LOC.  Skin: No skin rash.  Except as documented in the HPI, all other systems are negative.

## 2021-09-18 NOTE — ED PROVIDER NOTE - NSFOLLOWUPCLINICS_GEN_ALL_ED_FT
A Family Medicine Doctor  Family Medicine  .  [place]   Phone:   Fax:     [facility]  OB/GYN  [address] [place] [address]  Phone: [phone]  Fax:     [facility] ([facility])  [facility]  [facility] [address], [address] [place] [address]  Phone: [phone]  Fax:

## 2021-09-18 NOTE — ED PROVIDER NOTE - NSFOLLOWUPINSTRUCTIONS_ED_ALL_ED_FT
Vaginitis  ImageVaginitis is a condition in which the vaginal tissue swells and becomes red (inflamed). This condition is most often caused by a change in the normal balance of bacteria and yeast that live in the vagina. This change causes an overgrowth of certain bacteria or yeast, which causes the inflammation. There are different types of vaginitis, but the most common types are:    Bacterial vaginosis.   Yeast infection (candidiasis).  Trichomoniasis vaginitis. This is a sexually transmitted disease (STD).  Viral vaginitis.  Atrophic vaginitis.  Allergic vaginitis.    What are the causes?  The cause of this condition depends on the type of vaginitis. It can be caused by:    Bacteria (bacterial vaginosis).  Yeast, which is a fungus (yeast infection).  A parasite (trichomoniasis vaginitis).  A virus (viral vaginitis).  Low hormone levels (atrophic vaginitis). Low hormone levels can occur during pregnancy, breastfeeding, or after menopause.  Irritants, such as bubble baths, scented tampons, and feminine sprays (allergic vaginitis).    Other factors can change the normal balance of the yeast and bacteria that live in the vagina. These include:    Antibiotic medicines.  Poor hygiene.  Diaphragms, vaginal sponges, spermicides, birth control pills, and intrauterine devices (IUD).  Sex.  Infection.  Uncontrolled diabetes.  A weakened defense (immune) system.    What increases the risk?  This condition is more likely to develop in women who:    Smoke.  Use vaginal douches, scented tampons, or scented sanitary pads.  Wear tight-fitting pants.  Wear thong underwear.  Use oral birth control pills or an IUD.  Have sex without a condom.  Have multiple sex partners.  Have an STD.  Frequently use the spermicide nonoxynol-9.  Eat lots of foods high in sugar.  Have uncontrolled diabetes.  Have low estrogen levels.  Have a weakened immune system from an immune disorder or medical treatment.  Are pregnant or breastfeeding.    What are the signs or symptoms?  Symptoms vary depending on the cause of the vaginitis. Common symptoms include:    Abnormal vaginal discharge.    The discharge is white, gray, or yellow with bacterial vaginosis.  The discharge is thick, white, and cheesy with a yeast infection.  The discharge is frothy and yellow or greenish with trichomoniasis.    A bad vaginal smell. The smell is fishy with bacterial vaginosis.  Vaginal itching, pain, or swelling.  Sex that is painful.  Pain or burning when urinating.    Sometimes there are no symptoms.    How is this diagnosed?  This condition is diagnosed based on your symptoms and medical history. A physical exam, including a pelvic exam, will also be done. You may also have other tests, including:    Tests to determine the pH level (acidity or alkalinity) of your vagina.  A whiff test, to assess the odor that results when a sample of your vaginal discharge is mixed with a potassium hydroxide solution.  Tests of vaginal fluid. A sample will be examined under a microscope.    How is this treated?  Treatment varies depending on the type of vaginitis you have. Your treatment may include:    Antibiotic creams or pills to treat bacterial vaginosis and trichomoniasis.  Antifungal medicines, such as vaginal creams or suppositories, to treat a yeast infection.  Medicine to ease discomfort if you have viral vaginitis. Your sexual partner should also be treated.  Estrogen delivered in a cream, pill, suppository, or vaginal ring to treat atrophic vaginitis. If vaginal dryness occurs, lubricants and moisturizing creams may help. You may need to avoid scented soaps, sprays, or douches.  Stopping use of a product that is causing allergic vaginitis. Then using a vaginal cream to treat the symptoms.    Follow these instructions at home:  Lifestyle     Keep your genital area clean and dry. Avoid soap, and only rinse the area with water.  Do not douche or use tampons until your health care provider says it is okay to do so. Use sanitary pads, if needed.  Do not have sex until your health care provider approves. When you can return to sex, practice safe sex and use condoms.  Wipe from front to back. This avoids the spread of bacteria from the rectum to the vagina.  General instructions     Take over-the-counter and prescription medicines only as told by your health care provider.  If you were prescribed an antibiotic medicine, take or use it as told by your health care provider. Do not stop taking or using the antibiotic even if you start to feel better.  Keep all follow-up visits as told by your health care provider. This is important.  How is this prevented?  Use mild, non-scented products. Do not use things that can irritate the vagina, such as fabric softeners. Avoid the following products if they are scented:    Feminine sprays.  Detergents.  Tampons.  Feminine hygiene products.  Soaps or bubble baths.    Let air reach your genital area.    Wear cotton underwear to reduce moisture buildup.  Avoid wearing underwear while you sleep.  Avoid wearing tight pants and underwear or nylons without a cotton panel.  Avoid wearing thong underwear.    Take off any wet clothing, such as bathing suits, as soon as possible.  Practice safe sex and use condoms.  Contact a health care provider if:  You have abdominal pain.  You have a fever.  You have symptoms that last for more than 2–3 days.  Get help right away if:  You have a fever and your symptoms suddenly get worse.  Summary  Vaginitis is a condition in which the vaginal tissue becomes inflamed.This condition is most often caused by a change in the normal balance of bacteria and yeast that live in the vagina.  Treatment varies depending on the type of vaginitis you have.  Do not douche, use tampons , or have sex until your health care provider approves. When you can return to sex, practice safe sex and use condoms.  Back Pain    Back pain is very common in adults. The cause of back pain is rarely dangerous and the pain often gets better over time. The cause of your back pain may not be known and may include strain of muscles or ligaments, degeneration of the spinal disks, or arthritis. Occasionally the pain may radiate down your leg(s). Over-the-counter medicines to reduce pain and inflammation are often the most helpful. Stretching and remaining active frequently helps the healing process.     SEEK IMMEDIATE MEDICAL CARE IF YOU HAVE THE FOLLOWING SYMPTOMS: bowel or bladder control problems, unusual weakness or numbness in your arms or legs, nausea or vomiting, abdominal pain, fever, dizziness/lightheadedness.

## 2021-09-18 NOTE — ED ADULT TRIAGE NOTE - CHIEF COMPLAINT QUOTE
patient reports left flank pain onset 8 days ago radiating to left posterior leg and groin - patient reports painful urination and frequency

## 2021-09-20 LAB
CULTURE RESULTS: NO GROWTH — SIGNIFICANT CHANGE UP
SPECIMEN SOURCE: SIGNIFICANT CHANGE UP

## 2021-09-24 ENCOUNTER — APPOINTMENT (OUTPATIENT)
Dept: INTERNAL MEDICINE | Facility: CLINIC | Age: 56
End: 2021-09-24

## 2021-10-12 ENCOUNTER — APPOINTMENT (OUTPATIENT)
Dept: INTERNAL MEDICINE | Facility: CLINIC | Age: 56
End: 2021-10-12

## 2021-11-12 ENCOUNTER — APPOINTMENT (OUTPATIENT)
Dept: PULMONOLOGY | Facility: CLINIC | Age: 56
End: 2021-11-12
Payer: MEDICAID

## 2021-11-12 ENCOUNTER — OUTPATIENT (OUTPATIENT)
Dept: OUTPATIENT SERVICES | Facility: HOSPITAL | Age: 56
LOS: 1 days | Discharge: HOME | End: 2021-11-12

## 2021-11-12 ENCOUNTER — NON-APPOINTMENT (OUTPATIENT)
Age: 56
End: 2021-11-12

## 2021-11-12 DIAGNOSIS — Z90.710 ACQUIRED ABSENCE OF BOTH CERVIX AND UTERUS: Chronic | ICD-10-CM

## 2021-11-12 PROCEDURE — 99213 OFFICE O/P EST LOW 20 MIN: CPT | Mod: GC
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
## 2021-11-12 NOTE — END OF VISIT
[] : Resident [FreeTextEntry4] : Sen and examined with the pulmonary resident at the bed side.  Impression and plan formulated with the fellow
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
## 2021-11-12 NOTE — REVIEW OF SYSTEMS
[Fever] : no fever [Fatigue] : no fatigue [Chills] : no chills [Dry Eyes] : no dry eyes [Ear Disturbance] : no ear disturbance [Nasal Congestion] : no nasal congestion [Postnasal Drip] : no postnasal drip [Cough] : no cough [Hemoptysis] : no hemoptysis [Chest Tightness] : no chest tightness [Sputum] : no sputum [Dyspnea] : no dyspnea [Pleuritic Pain] : no pleuritic pain [Chest Discomfort] : no chest discomfort [Orthopnea] : no orthopnea [Palpitations] : no palpitations [Phlebitis] : no phlebitis [Hay Fever] : no hay fever [Watery Eyes] : no watery eyes [GERD] : no gerd [Abdominal Pain] : no abdominal pain [Nausea] : no nausea [Vomiting] : no vomiting [Diarrhea] : no diarrhea [Constipation] : no constipation [Dysuria] : no dysuria [Frequency] : no frequency [Urgency] : no urgency [Arthralgias] : no arthralgias [Myalgias] : no myalgias [Rash] : no rash [Anemia] : no anemia [History of Iron Deficiency] : no history of iron deficiency [Headache] : no headache [Head Injury] : no head injury [Dizziness] : no dizziness [Numbness] : no numbness [Depression] : no depression [Anxiety] : no anxiety

## 2021-11-12 NOTE — PHYSICAL EXAM
[No Acute Distress] : no acute distress [Well Nourished] : well nourished [Well Developed] : well developed [Normal Oropharynx] : normal oropharynx [Low Lying Soft Palate] : low lying soft palate [Erythema] : erythema [I] : Mallampati Class: I [Normal Appearance] : normal appearance [No Neck Mass] : no neck mass [Normal Rate/Rhythm] : normal rate/rhythm [Normal Pulses] : normal pulses [Normal S1, S2] : normal s1, s2 [No Murmurs] : no murmurs [No Resp Distress] : no resp distress [Clear to Auscultation Bilaterally] : clear to auscultation bilaterally [No Abnormalities] : no abnormalities [Benign] : benign [Not Tender] : not tender [No Masses] : no masses [Normal Gait] : normal gait [No Clubbing] : no clubbing [No Cyanosis] : no cyanosis [No Edema] : no edema [FROM] : FROM [Normal Color/ Pigmentation] : normal color/ pigmentation [No Rash] : no rash [No Focal Deficits] : no focal deficits [No Sensory Deficits] : no sensory deficits [Cranial Nerves Intact] : cranial nerves intact [Oriented x3] : oriented x3 [Normal Mood] : normal mood [Normal Affect] : normal affect

## 2022-01-04 ENCOUNTER — APPOINTMENT (OUTPATIENT)
Dept: INTERNAL MEDICINE | Facility: CLINIC | Age: 57
End: 2022-01-04

## 2022-01-04 ENCOUNTER — OUTPATIENT (OUTPATIENT)
Dept: OUTPATIENT SERVICES | Facility: HOSPITAL | Age: 57
LOS: 1 days | Discharge: HOME | End: 2022-01-04

## 2022-01-04 DIAGNOSIS — Z02.9 ENCOUNTER FOR ADMINISTRATIVE EXAMINATIONS, UNSPECIFIED: ICD-10-CM

## 2022-01-04 DIAGNOSIS — Z90.710 ACQUIRED ABSENCE OF BOTH CERVIX AND UTERUS: Chronic | ICD-10-CM

## 2022-01-13 NOTE — HISTORY OF PRESENT ILLNESS
[FreeTextEntry1] : 55 Yo F here for follow up.  [de-identified] : 56 year old female with PMHx of SEMAJ, obesity, HTN who presented to the Pulmonary clinic for follow up visit.

## 2022-01-13 NOTE — PLAN
[FreeTextEntry1] : Essential HTN: well controlled \par -BP Today: \par Patient said she needs refills for her bp meds\par -c/w Valsartan and Amlodipine \par - fu in 3 months\par - CMP normal\par \par GERD Gastritis: c/w PPI \par - GI referral for EGD/[facility]\par \par SEMAJ on CPAP\par - pulm on board\par \par osteoarthritis\par - tylenol PRN\par - Knee and back xray\par \par Vitamin D def\par - supplement\par \par HCM\par -Flu shot uptodate\par -COVID 19\par -GI: colonoscopy\par - Patient wants dental and ophtalmo referral\par - pap/mammogram UTD\par -Routine Labs reviewed. fu in 3 months. \par \par

## 2022-01-21 ENCOUNTER — RESULT CHARGE (OUTPATIENT)
Age: 57
End: 2022-01-21

## 2022-01-21 ENCOUNTER — OUTPATIENT (OUTPATIENT)
Dept: OUTPATIENT SERVICES | Facility: HOSPITAL | Age: 57
LOS: 1 days | Discharge: HOME | End: 2022-01-21

## 2022-01-21 ENCOUNTER — APPOINTMENT (OUTPATIENT)
Dept: OBGYN | Facility: CLINIC | Age: 57
End: 2022-01-21
Payer: MEDICAID

## 2022-01-21 VITALS
DIASTOLIC BLOOD PRESSURE: 80 MMHG | HEIGHT: 61 IN | WEIGHT: 274 LBS | SYSTOLIC BLOOD PRESSURE: 120 MMHG | BODY MASS INDEX: 51.73 KG/M2

## 2022-01-21 DIAGNOSIS — Z90.710 ACQUIRED ABSENCE OF BOTH CERVIX AND UTERUS: Chronic | ICD-10-CM

## 2022-01-21 DIAGNOSIS — R30.0 DYSURIA: ICD-10-CM

## 2022-01-21 LAB
BILIRUB UR QL STRIP: NORMAL
CLARITY UR: CLEAR
COLLECTION METHOD: NORMAL
GLUCOSE UR-MCNC: NORMAL
HCG UR QL: 0.2 EU/DL
HGB UR QL STRIP.AUTO: NORMAL
KETONES UR-MCNC: NORMAL
LEUKOCYTE ESTERASE UR QL STRIP: NORMAL
NITRITE UR QL STRIP: NORMAL
PH UR STRIP: 6
PROT UR STRIP-MCNC: NORMAL
SP GR UR STRIP: 1.03

## 2022-01-21 PROCEDURE — 99213 OFFICE O/P EST LOW 20 MIN: CPT

## 2022-01-21 NOTE — PHYSICAL EXAM
[Chaperone Present] : A chaperone was present in the examining room during all aspects of the physical examination [Appropriately responsive] : appropriately responsive [Alert] : alert [No Acute Distress] : no acute distress [Oriented x3] : oriented x3 [Atrophy] : atrophy [Normal] : normal [Absent] : absent [Adnexa Tenderness On The Left] : tender [FreeTextEntry6] : adnexa not palpable given body habitus

## 2022-01-21 NOTE — DISCUSSION/SUMMARY
[FreeTextEntry1] : 55yo P3 s/p hysterectomy, possible BS with UTI and atrophic vaginitis\par -urine dip positive for nitirites, keflex 500 TID x7 days sent to pharmacy, \par -patient offered vaginal estrogen, counselled patient that decline in estrogen can increase incidence of UTIs patient currently declining\par -f/u vaginitis panel\par \par adnexal tenderness\par -complete pelvic sonogram sent with [facility]\par \par Screening\par -mammogram script sent patient gets [id number] mamm[facility]\par -f/u in may for annual

## 2022-01-21 NOTE — HISTORY OF PRESENT ILLNESS
[Patient reported mammogram was normal] : Patient reported mammogram was normal [Patient reported PAP Smear was normal] : Patient reported PAP Smear was normal [FreeTextEntry1] : 55yo P3 s/p hysterectomy 20 years ago and post partum BTL, here for ED followup. Patient was seen in the ED for burning with urination patient treated empirically and told she should follow up with gynecologist as she might have a uterine infection. Patient states she had burning after she urinates and was only able to void small amounts at a time. After being treated her symptoms resolved. Patient states she now occaisionally has burning again but not as much as before but no other urinary symptoms. Patient is not sexually active, denies any abnormal vaginal discharge or post menopausal bleeding.  [Mammogramdate] : 6/24 [TextBox_19] : BIRADs 2  [PapSmeardate] : 5/28 [TextBox_31] : NILM HPV neg

## 2022-02-05 ENCOUNTER — RESULT REVIEW (OUTPATIENT)
Age: 57
End: 2022-02-05

## 2022-02-05 ENCOUNTER — OUTPATIENT (OUTPATIENT)
Dept: OUTPATIENT SERVICES | Facility: HOSPITAL | Age: 57
LOS: 1 days | Discharge: HOME | End: 2022-02-05
Payer: MEDICAID

## 2022-02-05 DIAGNOSIS — Z12.31 ENCOUNTER FOR SCREENING MAMMOGRAM FOR MALIGNANT NEOPLASM OF BREAST: ICD-10-CM

## 2022-02-05 DIAGNOSIS — Z90.710 ACQUIRED ABSENCE OF BOTH CERVIX AND UTERUS: Chronic | ICD-10-CM

## 2022-02-05 PROCEDURE — 77067 SCR MAMMO BI INCL CAD: CPT | Mod: 26

## 2022-02-05 PROCEDURE — 77063 BREAST TOMOSYNTHESIS BI: CPT | Mod: 26

## 2022-02-08 ENCOUNTER — OUTPATIENT (OUTPATIENT)
Dept: OUTPATIENT SERVICES | Facility: HOSPITAL | Age: 57
LOS: 1 days | Discharge: HOME | End: 2022-02-08

## 2022-02-08 ENCOUNTER — APPOINTMENT (OUTPATIENT)
Dept: INTERNAL MEDICINE | Facility: CLINIC | Age: 57
End: 2022-02-08
Payer: MEDICAID

## 2022-02-08 VITALS
HEIGHT: 60 IN | WEIGHT: 273 LBS | OXYGEN SATURATION: 98 % | HEART RATE: 76 BPM | BODY MASS INDEX: 53.6 KG/M2 | DIASTOLIC BLOOD PRESSURE: 80 MMHG | SYSTOLIC BLOOD PRESSURE: 130 MMHG | TEMPERATURE: 97 F

## 2022-02-08 DIAGNOSIS — Z90.710 ACQUIRED ABSENCE OF BOTH CERVIX AND UTERUS: Chronic | ICD-10-CM
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
## 2022-02-08 PROCEDURE — 99213 OFFICE O/P EST LOW 20 MIN: CPT

## 2022-02-08 RX ORDER — CEPHALEXIN 500 MG/1
500 TABLET ORAL 3 TIMES DAILY
Qty: 21 | Refills: 0 | Status: DISCONTINUED | COMMUNITY
Start: 2022-01-21 | End: 2022-02-08
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
## 2022-02-10 DIAGNOSIS — I10 ESSENTIAL (PRIMARY) HYPERTENSION: ICD-10-CM
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
## 2022-02-14 ENCOUNTER — OUTPATIENT (OUTPATIENT)
Dept: OUTPATIENT SERVICES | Facility: HOSPITAL | Age: 57
LOS: 1 days | Discharge: HOME | End: 2022-02-14
Payer: MEDICAID

## 2022-02-14 DIAGNOSIS — R10.2 PELVIC AND PERINEAL PAIN: ICD-10-CM

## 2022-02-14 DIAGNOSIS — Z01.419 ENCOUNTER FOR GYNECOLOGICAL EXAMINATION (GENERAL) (ROUTINE) WITHOUT ABNORMAL FINDINGS: ICD-10-CM

## 2022-02-14 DIAGNOSIS — Z90.710 ACQUIRED ABSENCE OF BOTH CERVIX AND UTERUS: Chronic | ICD-10-CM

## 2022-02-14 PROCEDURE — 76830 TRANSVAGINAL US NON-OB: CPT | Mod: 26

## 2022-02-14 PROCEDURE — 76856 US EXAM PELVIC COMPLETE: CPT | Mod: 26

## 2022-03-03 ENCOUNTER — NON-APPOINTMENT (OUTPATIENT)
Age: 57
End: 2022-03-03

## 2022-05-20 ENCOUNTER — OUTPATIENT (OUTPATIENT)
Dept: OUTPATIENT SERVICES | Facility: HOSPITAL | Age: 57
LOS: 1 days | Discharge: HOME | End: 2022-05-20

## 2022-05-20 ENCOUNTER — APPOINTMENT (OUTPATIENT)
Dept: OBGYN | Facility: CLINIC | Age: 57
End: 2022-05-20
Payer: MEDICAID

## 2022-05-20 VITALS
WEIGHT: 278 LBS | BODY MASS INDEX: 54.58 KG/M2 | SYSTOLIC BLOOD PRESSURE: 141 MMHG | DIASTOLIC BLOOD PRESSURE: 67 MMHG | HEIGHT: 60 IN

## 2022-05-20 DIAGNOSIS — Z90.710 ACQUIRED ABSENCE OF BOTH CERVIX AND UTERUS: Chronic | ICD-10-CM

## 2022-05-20 DIAGNOSIS — R35.0 FREQUENCY OF MICTURITION: ICD-10-CM

## 2022-05-20 PROCEDURE — 99396 PREV VISIT EST AGE 40-64: CPT

## 2022-05-20 PROCEDURE — 99213 OFFICE O/P EST LOW 20 MIN: CPT | Mod: 25

## 2022-06-03 ENCOUNTER — APPOINTMENT (OUTPATIENT)
Dept: OBGYN | Facility: CLINIC | Age: 57
End: 2022-06-03

## 2022-08-19 ENCOUNTER — OUTPATIENT (OUTPATIENT)
Dept: OUTPATIENT SERVICES | Facility: HOSPITAL | Age: 57
LOS: 1 days | Discharge: HOME | End: 2022-08-19

## 2022-08-19 ENCOUNTER — APPOINTMENT (OUTPATIENT)
Dept: PULMONOLOGY | Facility: CLINIC | Age: 57
End: 2022-08-19

## 2022-08-19 VITALS
SYSTOLIC BLOOD PRESSURE: 121 MMHG | DIASTOLIC BLOOD PRESSURE: 73 MMHG | HEIGHT: 60 IN | WEIGHT: 279 LBS | HEART RATE: 64 BPM | OXYGEN SATURATION: 100 % | BODY MASS INDEX: 54.77 KG/M2 | TEMPERATURE: 97.4 F

## 2022-08-19 DIAGNOSIS — E66.01 MORBID (SEVERE) OBESITY DUE TO EXCESS CALORIES: ICD-10-CM

## 2022-08-19 DIAGNOSIS — Z90.710 ACQUIRED ABSENCE OF BOTH CERVIX AND UTERUS: Chronic | ICD-10-CM

## 2022-08-19 DIAGNOSIS — R91.1 SOLITARY PULMONARY NODULE: ICD-10-CM

## 2022-08-19 DIAGNOSIS — G47.33 OBSTRUCTIVE SLEEP APNEA (ADULT) (PEDIATRIC): ICD-10-CM

## 2022-08-19 PROCEDURE — 99213 OFFICE O/P EST LOW 20 MIN: CPT | Mod: GC

## 2022-08-19 NOTE — REVIEW OF SYSTEMS
[Negative] : Neurologic [TextBox_94] : complained of lower limb pains and some difficulty in lifting weights
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
## 2022-08-19 NOTE — REASON FOR VISIT
[Follow-Up] : a follow-up visit [Sleep Apnea] : sleep apnea [Pulmonary Nodules] : pulmonary nodules [Obesity] : obesity

## 2022-08-19 NOTE — END OF VISIT
[] : Resident [FreeTextEntry3] : Seen and examined with the resident.\par Impression and plan are my own.\par CT chest 2021 Right 4mm nodule. \par History of smoking. \par Repeat CT and if stable no further follow up. \par F/U in 3 months.

## 2022-09-22 ENCOUNTER — OUTPATIENT (OUTPATIENT)
Dept: OUTPATIENT SERVICES | Facility: HOSPITAL | Age: 57
LOS: 1 days | Discharge: HOME | End: 2022-09-22

## 2022-09-22 ENCOUNTER — RESULT REVIEW (OUTPATIENT)
Age: 57
End: 2022-09-22

## 2022-09-22 DIAGNOSIS — R91.1 SOLITARY PULMONARY NODULE: ICD-10-CM

## 2022-09-22 DIAGNOSIS — Z90.710 ACQUIRED ABSENCE OF BOTH CERVIX AND UTERUS: Chronic | ICD-10-CM

## 2022-09-22 PROCEDURE — 71250 CT THORAX DX C-: CPT | Mod: 26

## 2022-10-14 ENCOUNTER — APPOINTMENT (OUTPATIENT)
Dept: OBGYN | Facility: CLINIC | Age: 57
End: 2022-10-14

## 2022-11-11 ENCOUNTER — APPOINTMENT (OUTPATIENT)
Dept: UROGYNECOLOGY | Facility: CLINIC | Age: 57
End: 2022-11-11

## 2022-11-11 ENCOUNTER — OUTPATIENT (OUTPATIENT)
Dept: OUTPATIENT SERVICES | Facility: HOSPITAL | Age: 57
LOS: 1 days | Discharge: HOME | End: 2022-11-11

## 2022-11-11 VITALS — DIASTOLIC BLOOD PRESSURE: 64 MMHG | WEIGHT: 278 LBS | SYSTOLIC BLOOD PRESSURE: 138 MMHG | BODY MASS INDEX: 54.29 KG/M2

## 2022-11-11 DIAGNOSIS — Z01.419 ENCOUNTER FOR GYNECOLOGICAL EXAMINATION (GENERAL) (ROUTINE) W/OUT ABNORMAL FINDINGS: ICD-10-CM

## 2022-11-11 DIAGNOSIS — Z90.710 ACQUIRED ABSENCE OF BOTH CERVIX AND UTERUS: Chronic | ICD-10-CM

## 2022-11-11 PROCEDURE — 51701 INSERT BLADDER CATHETER: CPT

## 2022-11-11 PROCEDURE — 99205 OFFICE O/P NEW HI 60 MIN: CPT | Mod: 25

## 2022-11-11 RX ORDER — ACETAMINOPHEN 500 MG/1
500 CAPSULE, LIQUID FILLED ORAL
Qty: 30 | Refills: 3 | Status: DISCONTINUED | COMMUNITY
Start: 2021-04-23 | End: 2022-11-11

## 2022-11-11 RX ORDER — PANTOPRAZOLE 40 MG/1
40 TABLET, DELAYED RELEASE ORAL
Qty: 90 | Refills: 3 | Status: DISCONTINUED | COMMUNITY
Start: 2020-01-17 | End: 2022-11-11

## 2022-11-11 NOTE — PHYSICAL EXAM
[Chaperone Present] : A chaperone was present in the examining room during all aspects of the physical examination [FreeTextEntry1] : Void: 300 cc\par \par PVR: 40 cc\par \par Urethra was prepped in sterile fashion and then a sterile 14F catheter was used by me to drain the bladder for her symptoms of urinary frequency. Patient tolerated the procedure well\par \par neg empty cough stress test\par \par + atrophy\par \par no urethral caruncle\par \par no vestibular tenderness\par \par no prolapse\par \par + urethral hypermobility\par \par no pelvic floor dysfunction\par \par no urethral tenderness\par \par no bladder tenderness\par \par normal appearing cervix\par \par unable to palpate uterus due to body habitus\par \par adnexa nonpalpable\par \par intact sacral nerves\par \par 2/5 Kegel\par

## 2022-11-11 NOTE — ASSESSMENT
[FreeTextEntry1] : Overactive bladder syndrome -\par Discussed etiology of the condition with the patient. Discussed management options, including first line management options consisting of diet and lifestyle modifications, second line options consisting of medications, and third line options. Reviewed PTNS, bladder botox, and Interstim. Patient will start with bladder diet, bladder training and vaginal estrogen cream. We spoke about bladder irritants at length, and she was given a list of bladder irritants to avoid.\par We also discussed the importance of healthy weight loss and how it relates to her urinary symptoms.\par She is going to [place] in January and will return for appointment in February.\par \par Vaginal atrophy -\par Discussed etiology and treatment options with patient. Discussed R/B/A of estrogen vaginal cream. Patient will start using as follows:\par Place a pea size (0.5 grams or less) dab of Estrogen vaginal cream using finger (NOT the applicator) into the vagina 3 times a week ( Monday, Wednesday, Friday)\par \par \par

## 2022-11-11 NOTE — REASON FOR VISIT
[Other: ______] : provided by DEANNA [Interpreters_IDNumber] : [id number] [Interpreters_FullName] : Anel [TWNoteComboBox1] : [place]

## 2022-11-11 NOTE — COUNSELING
[FreeTextEntry1] : Please follow up with the physician assistant in February.\par \par Place a pea size (0.5 grams or less) dab of Estrogen vaginal cream using finger (NOT the applicator) into the vagina 3 times a week ( Monday, Wednesday, Friday)\par \par For Urgency, Frequency and Urge related incontinence.\par \par Please decrease or stop the use of the following:\par \par 1. Coffee (Caffeinated and decaffeinated)\par \par 2. Teas (Caffeinated, decaffeinated, Ice tea, and green teas\par \par 3. All sodas (Caffeinated, decaffeinated, energy drinks)\par \par 4. All carbonated drinks including seltzer water\par \par 5. All citric fruit juices\par \par 6. Water with lemon or lime\par \par 7. Spicy foods\par \par 8. Tomato Sauce based foods\par \par 9. Chocolate and chocolate containing products\par \par 10. All alcohol\par \par To reduce urinary frequency at night, please restrict all fluid intake 2-3 hours prior to bedtime.\par \par If you lose weight (even as little as 5%) your urinary symptoms will greatly improve.

## 2022-11-11 NOTE — HISTORY OF PRESENT ILLNESS
[FreeTextEntry1] : 57 year para 3 ( x3) presents with complaints of urinary frequency for the past 3 months.\par \par Pelvic organ prolapse: no bulge, no pressure/heaviness\par \par Stress urinary incontinence: 2-3 x/week no prior incontinence procedures\par \par Overactive bladder syndrome: daily frequency 7-8 x/day, 5-6 x/night,  + urgency,  <1 x/week UUI episodes,   0-1 pads/day     Bladder irritants include coffee, tea,    Prior OAB meds no\par \par Voiding dysfunction: + Incomplete bladder emptying, no hesitancy\par \par Lower urinary tract/vaginal symptoms: no UTIs per year, no hematuria, no dysuria, no bladder pain\par \par 7 BM/week   no constipation   Fecal incontinence no\par \par Sexually active no   Pelvic pain no   Vaginal dryness no   LMP age 41 (s/p hysterectomy )  PMB no\par \par PMSH:\par Hysterectomy\par

## 2022-11-14 DIAGNOSIS — N95.2 POSTMENOPAUSAL ATROPHIC VAGINITIS: ICD-10-CM

## 2022-11-14 DIAGNOSIS — R35.0 FREQUENCY OF MICTURITION: ICD-10-CM

## 2022-11-14 DIAGNOSIS — N39.41 URGE INCONTINENCE: ICD-10-CM

## 2022-11-14 DIAGNOSIS — R39.15 URGENCY OF URINATION: ICD-10-CM

## 2022-11-14 LAB
APPEARANCE: CLEAR
BILIRUBIN URINE: NEGATIVE
BLOOD URINE: NEGATIVE
COLOR: NORMAL
GLUCOSE QUALITATIVE U: NEGATIVE
KETONES URINE: NEGATIVE
LEUKOCYTE ESTERASE URINE: NEGATIVE
NITRITE URINE: NEGATIVE
PH URINE: 6.5
PROTEIN URINE: NEGATIVE
SPECIFIC GRAVITY URINE: 1.02
URINE CULTURE <10: NORMAL
UROBILINOGEN URINE: NORMAL

## 2022-11-25 ENCOUNTER — APPOINTMENT (OUTPATIENT)
Dept: PULMONOLOGY | Facility: CLINIC | Age: 57
End: 2022-11-25

## 2023-02-01 ENCOUNTER — APPOINTMENT (OUTPATIENT)
Dept: CARDIOLOGY | Facility: CLINIC | Age: 58
End: 2023-02-01
Payer: MEDICAID

## 2023-02-01 ENCOUNTER — RESULT CHARGE (OUTPATIENT)
Age: 58
End: 2023-02-01

## 2023-02-01 VITALS
WEIGHT: 282 LBS | HEART RATE: 82 BPM | BODY MASS INDEX: 55.36 KG/M2 | SYSTOLIC BLOOD PRESSURE: 130 MMHG | TEMPERATURE: 96.7 F | HEIGHT: 60 IN | DIASTOLIC BLOOD PRESSURE: 70 MMHG

## 2023-02-01 DIAGNOSIS — Z78.9 OTHER SPECIFIED HEALTH STATUS: ICD-10-CM

## 2023-02-01 PROCEDURE — 93000 ELECTROCARDIOGRAM COMPLETE: CPT | Mod: NC

## 2023-02-01 PROCEDURE — 99204 OFFICE O/P NEW MOD 45 MIN: CPT

## 2023-02-09 ENCOUNTER — APPOINTMENT (OUTPATIENT)
Dept: UROGYNECOLOGY | Facility: CLINIC | Age: 58
End: 2023-02-09

## 2023-02-22 ENCOUNTER — APPOINTMENT (OUTPATIENT)
Dept: UROGYNECOLOGY | Facility: CLINIC | Age: 58
End: 2023-02-22
Payer: MEDICAID

## 2023-02-22 VITALS — BODY MASS INDEX: 55.36 KG/M2 | WEIGHT: 282 LBS | HEIGHT: 60 IN

## 2023-02-22 PROCEDURE — 99214 OFFICE O/P EST MOD 30 MIN: CPT

## 2023-02-22 RX ORDER — VALSARTAN AND HYDROCHLOROTHIAZIDE 160; 25 MG/1; MG/1
160-25 TABLET, FILM COATED ORAL DAILY
Qty: 30 | Refills: 3 | Status: COMPLETED | COMMUNITY
Start: 2021-01-29 | End: 2023-02-22

## 2023-02-24 NOTE — DISCUSSION/SUMMARY
[FreeTextEntry1] : \par Vaginal atrophy\par ANGELES Galindo called the pharmacy, estrace was not covered by insurance but the patient's insurance did cover premarin cream. Rx sent, directions re-explained to the patient. Precautions reviewed\par Patient will return in 3 months for follow up or earlier if she has any issues\par \par

## 2023-02-24 NOTE — COUNSELING
[FreeTextEntry1] : \par If you feel like you have an infection it is important for you to call our office and we will arrange testing of your urine.\par \par Please apply a pea size amount to the inside of the vagina three times a week. \par \par Please call my office if you have any issues with the cost or side effects of the medication. \par \par Schedule a 3 months follow up med check appointment.\par

## 2023-02-24 NOTE — HISTORY OF PRESENT ILLNESS
[FreeTextEntry1] : Patient is here for 6 weeks/months med check for urge incontinence.\par Last seen on 2022 as a new patient.\par \par From initial visit: 2022\par 57 year para 3 ( x3) presents with complaints of urinary frequency for the past 3 months.\par \par Pelvic organ prolapse: no bulge, no pressure/heaviness\par \par Stress urinary incontinence: 2-3 x/week no prior incontinence procedures\par \par Overactive bladder syndrome: daily frequency 7-8 x/day, 5-6 x/night, + urgency, <1 x/week UUI episodes, 0-1 pads/day Bladder irritants include coffee, tea, Prior OAB meds no\par \par Voiding dysfunction: + Incomplete bladder emptying, no hesitancy\par \par Lower urinary tract/vaginal symptoms: no UTIs per year, no hematuria, no dysuria, no bladder pain\par \par 7 BM/week no constipation Fecal incontinence no\par \par Sexually active no Pelvic pain no Vaginal dryness no LMP age 41 (s/p hysterectomy ) PMB no\par \par PMSH:\par Hysterectomy\par \par estrace\par \par \par Today, patient states she never started estrace cream. Not bothered by any urinary complaints. Patient does not feel she has an infection.\par \par Patient would like to start using estrace cream\par

## 2023-03-15 ENCOUNTER — APPOINTMENT (OUTPATIENT)
Dept: CARDIOLOGY | Facility: CLINIC | Age: 58
End: 2023-03-15
Payer: MEDICAID

## 2023-03-15 PROCEDURE — 93306 TTE W/DOPPLER COMPLETE: CPT

## 2023-03-15 NOTE — REASON FOR VISIT
[Symptom and Test Evaluation] : symptom and test evaluation [CV Risk Factors and Non-Cardiac Disease] : CV risk factors and non-cardiac disease [[employer] ] : provided by [employer]   [Interpreters_IDNumber] : [id number] [FreeTextEntry1] : Ms. BRANDY HARDEN is a 57 year old woman with PMHx of HTN, morbid obesity who is presenting for pre-op evaluation prior to bariatric surgery. She feels well and has no concerns today. She has no chest pain, SOB or palpitations. She can walk up 1 flight of stairs, but cannot walk up a second. \par \par She has no FHx of CVD. She does not smoke.

## 2023-03-15 NOTE — ASSESSMENT
[FreeTextEntry1] : Ms. BRANDY RANDOLPH is a 57 year old woman with PMHx of HTN, morbid obesity who is presenting for pre-op evaluation prior to bariatric surgery. \par \par Based on the RCRI, Ms Randolph is low risk for the planned surgical procedure. She has poor functional capacity, thus will order a TTE to rule out cardiac structural changes. If TTE is normal, can proceed with surgery.\par \par RTC in 4 weeks after TTE to follow up\par \par Will fax note and echo report to Dr Jack clinic, Fax [phone]\par \par Addy Tobin MD\par Non-Invasive Cardiology\par [facility]\par [facility]\par [address], [address]\par Office: [phone]\par

## 2023-03-15 NOTE — ADDENDUM
[FreeTextEntry1] : Addendum 3/15/23\par Reviewed TTE- which demonstrates normal LV/RV function, mild TR, and no evidence of PH.\par \par No further cardiac work up necessary prior to surgery. Can proceed with above risk assessment.

## 2023-03-15 NOTE — PHYSICAL EXAM
[Well Developed] : well developed [Well Nourished] : well nourished [No Acute Distress] : no acute distress [Normal Conjunctiva] : normal conjunctiva [Normal Venous Pressure] : normal venous pressure [No Carotid Bruit] : no carotid bruit [Normal S1, S2] : normal S1, S2 [No Murmur] : no murmur [No Rub] : no rub [No Gallop] : no gallop [Clear Lung Fields] : clear lung fields [Good Air Entry] : good air entry [No Respiratory Distress] : no respiratory distress  [Soft] : abdomen soft [Normal Gait] : normal gait [No Edema] : no edema [No Rash] : no rash [No Skin Lesions] : no skin lesions [Moves all extremities] : moves all extremities [No Focal Deficits] : no focal deficits [Normal Speech] : normal speech [Alert and Oriented] : alert and oriented [Normal memory] : normal memory [de-identified] : Obese

## 2023-03-27 ENCOUNTER — RESULT CHARGE (OUTPATIENT)
Age: 58
End: 2023-03-27

## 2023-03-30 ENCOUNTER — APPOINTMENT (OUTPATIENT)
Dept: CARDIOLOGY | Facility: CLINIC | Age: 58
End: 2023-03-30
Payer: MEDICAID

## 2023-03-30 ENCOUNTER — RESULT CHARGE (OUTPATIENT)
Age: 58
End: 2023-03-30

## 2023-03-30 VITALS
DIASTOLIC BLOOD PRESSURE: 85 MMHG | WEIGHT: 278 LBS | SYSTOLIC BLOOD PRESSURE: 128 MMHG | HEIGHT: 60 IN | TEMPERATURE: 98.7 F | HEART RATE: 60 BPM | BODY MASS INDEX: 54.58 KG/M2

## 2023-03-30 DIAGNOSIS — Z01.810 ENCOUNTER FOR PREPROCEDURAL CARDIOVASCULAR EXAMINATION: ICD-10-CM

## 2023-03-30 PROCEDURE — 93000 ELECTROCARDIOGRAM COMPLETE: CPT

## 2023-03-30 PROCEDURE — T1013: CPT

## 2023-03-30 PROCEDURE — 99214 OFFICE O/P EST MOD 30 MIN: CPT

## 2023-03-30 NOTE — ASSESSMENT
[FreeTextEntry1] : Ms. BRANDY RANDOLPH is a 57 year old woman with PMHx of HTN, morbid obesity who is presenting for pre-op evaluation prior to bariatric surgery on 4/18/23. \par \par Based on the RCRI, Ms Randolph is low risk for the planned surgical procedure for major CV event. She has poor functional capacity, but underwent TTE, which demonstrates normal LV/RV function, mild TR, and no evidence of PH. No further cardiac testing is necessary prior surgical procedure.  \par \par Will fax note and echo report to Dr Jack clinic, Fax [phone]\par \par RTC in 6 months for further management of HTN and ASCVD risk\par \par Addy Tobin MD, FACC\par Non-Invasive Cardiology\par [facility]\par [facility]\par [address]\par Office: [phone]\par

## 2023-03-30 NOTE — REASON FOR VISIT
[Symptom and Test Evaluation] : symptom and test evaluation [CV Risk Factors and Non-Cardiac Disease] : CV risk factors and non-cardiac disease [[employer] ] : provided by [employer]   [Time Spent: ____ minutes] : Total time spent using  services: [unfilled] minutes. The patient's primary language is not English thus required  services. [Interpreters_IDNumber] : [id number] [Interpreters_FullName] : Parmjit [TWNoteComboBox1] : Bots[place]an [FreeTextEntry1] : Ms. BRANDY HARDEN is a 57 year old woman with PMHx of HTN, morbid obesity who is presenting for pre-op evaluation prior to bariatric surgery on 4/18/23. She feels well today and has no concerns today. She has no SOB, VASQUEZ or chest pain. She underwent TTE, which was unremarkable.\par \par TTE\par CONCLUSIONS:\par 1.The left ventricular systolic function is normal with an ejection fraction of 64 %.\par 2.Normal right ventricular size and normal systolic function.\par 3.Mild tricuspid regurgitation.\par 4.No echocardiographic evidence of pulmonary hypertension.\par 5.No prior echo is available for comparison\par \par

## 2023-03-30 NOTE — REASON FOR VISIT
[Symptom and Test Evaluation] : symptom and test evaluation [CV Risk Factors and Non-Cardiac Disease] : CV risk factors and non-cardiac disease [[employer] ] : provided by [employer]   [Time Spent: ____ minutes] : Total time spent using  services: [unfilled] minutes. The patient's primary language is not English thus required  services. [Interpreters_IDNumber] : [id number] [Interpreters_FullName] : Parmjit [TWNoteComboBox1] : Somali [FreeTextEntry1] : Ms. BRANDY HARDEN is a 57 year old woman with PMHx of HTN, morbid obesity who is presenting for pre-op evaluation prior to bariatric surgery on 4/18/23. She feels well today and has no concerns today. She has no SOB, VASQUEZ or chest pain. She underwent TTE, which was unremarkable.\par \par TTE\par CONCLUSIONS:\par 1.The left ventricular systolic function is normal with an ejection fraction of 64 %.\par 2.Normal right ventricular size and normal systolic function.\par 3.Mild tricuspid regurgitation.\par 4.No echocardiographic evidence of pulmonary hypertension.\par 5.No prior echo is available for comparison\par \par

## 2023-03-30 NOTE — ASSESSMENT
[FreeTextEntry1] : Ms. BRANDY RANDOLPH is a 57 year old woman with PMHx of HTN, morbid obesity who is presenting for pre-op evaluation prior to bariatric surgery on 4/18/23. \par \par Based on the RCRI, Ms Randolph is low risk for the planned surgical procedure for major CV event. She has poor functional capacity, but underwent TTE, which demonstrates normal LV/RV function, mild TR, and no evidence of PH. No further cardiac testing is necessary prior surgical procedure.  \par \par Will fax note and echo report to Dr Jack clinic, Fax [phone]\par \par RTC in 6 months for further management of HTN and ASCVD risk\par \par Addy Tobin MD, FACC\par Non-Invasive Cardiology\par [facility]\par [facility]\par [address]\par Office: [phone]\par

## 2023-05-26 ENCOUNTER — APPOINTMENT (OUTPATIENT)
Dept: OBGYN | Facility: CLINIC | Age: 58
End: 2023-05-26
Payer: MEDICAID

## 2023-05-26 ENCOUNTER — APPOINTMENT (OUTPATIENT)
Dept: OBGYN | Facility: CLINIC | Age: 58
End: 2023-05-26

## 2023-05-26 ENCOUNTER — OUTPATIENT (OUTPATIENT)
Dept: OUTPATIENT SERVICES | Facility: HOSPITAL | Age: 58
LOS: 1 days | End: 2023-05-26
Payer: MEDICAID

## 2023-05-26 VITALS
SYSTOLIC BLOOD PRESSURE: 138 MMHG | BODY MASS INDEX: 49.52 KG/M2 | WEIGHT: 252.25 LBS | DIASTOLIC BLOOD PRESSURE: 72 MMHG | HEIGHT: 60 IN

## 2023-05-26 DIAGNOSIS — Z01.419 ENCOUNTER FOR GYNECOLOGICAL EXAMINATION (GENERAL) (ROUTINE) W/OUT ABNORMAL FINDINGS: ICD-10-CM

## 2023-05-26 DIAGNOSIS — Z90.710 ACQUIRED ABSENCE OF BOTH CERVIX AND UTERUS: Chronic | ICD-10-CM

## 2023-05-26 DIAGNOSIS — Z01.419 ENCOUNTER FOR GYNECOLOGICAL EXAMINATION (GENERAL) (ROUTINE) WITHOUT ABNORMAL FINDINGS: ICD-10-CM

## 2023-05-26 PROCEDURE — 99396 PREV VISIT EST AGE 40-64: CPT

## 2023-05-26 PROCEDURE — T1013: CPT

## 2023-05-29 PROBLEM — Z01.419 WELL WOMAN EXAM WITH ROUTINE GYNECOLOGICAL EXAM: Status: ACTIVE | Noted: 2023-05-29

## 2023-05-29 NOTE — PHYSICAL EXAM
[Chaperone Present] : A chaperone was present in the examining room during all aspects of the physical examination [Appropriately responsive] : appropriately responsive [Alert] : alert [No Acute Distress] : no acute distress [Soft] : soft [Non-tender] : non-tender [No Lesions] : no lesions [No Mass] : no mass [Oriented x3] : oriented x3 [Examination Of The Breasts] : a normal appearance [No Masses] : no breast masses were palpable [Labia Majora] : normal [Labia Minora] : normal [Atrophy] : atrophy [Normal] : normal [Absent] : absent [FreeTextEntry7] : healing laparascopic incisions.  [FreeTextEntry5] : post-hysterectomy

## 2023-05-29 NOTE — HISTORY OF PRESENT ILLNESS
[No] : Patient does not have concerns regarding sex [Previously active] : previously active [FreeTextEntry1] : 57 yo P3 with PMH HTN, obesity, s/p supracervical hysterectomy for fibroids at age 36, presents for annual exam. Last year pt with urinary complaints, referred to URO/GYN. Pt currently following with Dr. Sanabria, reports improvement in symptoms. Was prescribed premarin at recent visit but reports it burns on application so has not been using it. Pt not currently sexually active. Denies any current complaints. Pt reports she underwent gastric bypass on 5/8, recovering well. \par \par Last pap: 2021, NILM\par Last mammogram: 2022 - BIRAD 2\par Last colonoscopy: 5 years

## 2023-05-29 NOTE — DISCUSSION/SUMMARY
[FreeTextEntry1] : A/P: 59 yo P3, s/p supracervical hysterectomy, for annual\par -Mammo Rx given\par -up to date on pap\par -f/u with URO/GYN for medication management questions and urge incontinence \par -RTC 1 year for annual

## 2023-05-30 DIAGNOSIS — Z01.419 ENCOUNTER FOR GYNECOLOGICAL EXAMINATION (GENERAL) (ROUTINE) WITHOUT ABNORMAL FINDINGS: ICD-10-CM

## 2023-06-20 ENCOUNTER — APPOINTMENT (OUTPATIENT)
Dept: UROGYNECOLOGY | Facility: CLINIC | Age: 58
End: 2023-06-20
Payer: MEDICAID

## 2023-06-20 VITALS
HEIGHT: 60 IN | BODY MASS INDEX: 49.48 KG/M2 | SYSTOLIC BLOOD PRESSURE: 111 MMHG | DIASTOLIC BLOOD PRESSURE: 69 MMHG | WEIGHT: 252 LBS | HEART RATE: 91 BPM

## 2023-06-20 DIAGNOSIS — N95.2 POSTMENOPAUSAL ATROPHIC VAGINITIS: ICD-10-CM

## 2023-06-20 DIAGNOSIS — Z87.898 PERSONAL HISTORY OF OTHER SPECIFIED CONDITIONS: ICD-10-CM

## 2023-06-20 DIAGNOSIS — N39.41 URGE INCONTINENCE: ICD-10-CM

## 2023-06-20 DIAGNOSIS — R35.0 FREQUENCY OF MICTURITION: ICD-10-CM

## 2023-06-20 DIAGNOSIS — K59.00 CONSTIPATION, UNSPECIFIED: ICD-10-CM

## 2023-06-20 PROCEDURE — 99214 OFFICE O/P EST MOD 30 MIN: CPT

## 2023-06-20 RX ORDER — VALSARTAN AND HYDROCHLOROTHIAZIDE 160; 25 MG/1; MG/1
160-25 TABLET, FILM COATED ORAL
Refills: 0 | Status: COMPLETED | COMMUNITY
End: 2023-06-20

## 2023-06-20 RX ORDER — ESTRADIOL 0.1 MG/G
0.1 CREAM VAGINAL
Qty: 1 | Refills: 6 | Status: COMPLETED | COMMUNITY
Start: 2022-11-11 | End: 2023-06-20

## 2023-06-20 RX ORDER — ASPIRIN 81 MG/1
81 TABLET, CHEWABLE ORAL
Qty: 30 | Refills: 0 | Status: COMPLETED | COMMUNITY
Start: 2022-09-20 | End: 2023-06-20

## 2023-06-20 RX ORDER — AMLODIPINE BESYLATE 5 MG/1
5 TABLET ORAL DAILY
Qty: 30 | Refills: 3 | Status: COMPLETED | COMMUNITY
Start: 2021-01-29 | End: 2023-06-20

## 2023-06-20 RX ORDER — CONJUGATED ESTROGENS 0.62 MG/G
0.62 CREAM VAGINAL
Qty: 1 | Refills: 1 | Status: COMPLETED | COMMUNITY
Start: 2023-02-22 | End: 2023-06-20

## 2023-06-20 RX ORDER — PANTOPRAZOLE 40 MG/1
40 TABLET, DELAYED RELEASE ORAL DAILY
Qty: 30 | Refills: 1 | Status: COMPLETED | COMMUNITY
Start: 2021-05-14 | End: 2023-06-20

## 2023-06-21 NOTE — DISCUSSION/SUMMARY
[FreeTextEntry1] : Urinary frequency\par Pt is doing well currently, advised to continue to monitor bladder irritants diet\par \par Constipation\par bowel recipe provided, or try Miralax, MOM\par \par Follow up as needed

## 2023-06-21 NOTE — COUNSELING
[FreeTextEntry1] : If you feel like you have an infection it is important for you to call our office and we will arrange testing of your urine.\par \par Please follow our recommended bowel recipe to control your constipation. You may go up to 4 tablespoons. \par \par You can use Miralax or Milk of Magnesia (both over the counter) for constipation\par \par Please call my office if you have any issues or questions. \par \par Follow up as needed.\par

## 2023-06-21 NOTE — HISTORY OF PRESENT ILLNESS
[FreeTextEntry1] : Patient is here for 4 months med check for urge incontinence. Visit done with translation from  medical assistant, Mary Ellen Doherty. \par Last seen on 23 for follow up on urge incontinence. \par \par From initial visit: 2022\par 57 year para 3 ( x3) presents with complaints of urinary frequency for the past 3 months.\par \par PMSH:\par Hysterectomy\par \par s/p Premarin cream\par \par Today, patient states that she used premarin cream for about a month, stopped 2 months ago because it caused her GI discomfort. Currently pt states that she feels well, denies any urinary complains, denies any pain. C/o constipation. Does not want any bladder medications or cream. Patient does not feel she has an infection.\par \par

## 2023-08-18 ENCOUNTER — APPOINTMENT (OUTPATIENT)
Dept: NEUROLOGY | Facility: CLINIC | Age: 58
End: 2023-08-18

## 2023-09-18 ENCOUNTER — APPOINTMENT (OUTPATIENT)
Dept: CARDIOLOGY | Facility: CLINIC | Age: 58
End: 2023-09-18
Payer: MEDICAID

## 2023-09-18 VITALS
WEIGHT: 208 LBS | HEART RATE: 64 BPM | BODY MASS INDEX: 40.84 KG/M2 | DIASTOLIC BLOOD PRESSURE: 80 MMHG | HEIGHT: 60 IN | SYSTOLIC BLOOD PRESSURE: 120 MMHG

## 2023-09-18 PROCEDURE — T1013A: CUSTOM

## 2023-09-18 PROCEDURE — 93000 ELECTROCARDIOGRAM COMPLETE: CPT

## 2023-09-18 PROCEDURE — 99214 OFFICE O/P EST MOD 30 MIN: CPT

## 2023-09-28 ENCOUNTER — APPOINTMENT (OUTPATIENT)
Dept: CARDIOLOGY | Facility: CLINIC | Age: 58
End: 2023-09-28

## 2023-10-05 ENCOUNTER — OUTPATIENT (OUTPATIENT)
Dept: OUTPATIENT SERVICES | Facility: HOSPITAL | Age: 58
LOS: 1 days | End: 2023-10-05
Payer: MEDICAID

## 2023-10-05 ENCOUNTER — RESULT REVIEW (OUTPATIENT)
Age: 58
End: 2023-10-05

## 2023-10-05 DIAGNOSIS — Z90.710 ACQUIRED ABSENCE OF BOTH CERVIX AND UTERUS: Chronic | ICD-10-CM

## 2023-10-05 DIAGNOSIS — I10 ESSENTIAL (PRIMARY) HYPERTENSION: ICD-10-CM

## 2023-10-05 PROCEDURE — 75571 CT HRT W/O DYE W/CA TEST: CPT

## 2023-10-05 PROCEDURE — 75571 CT HRT W/O DYE W/CA TEST: CPT | Mod: 26

## 2023-10-06 DIAGNOSIS — I10 ESSENTIAL (PRIMARY) HYPERTENSION: ICD-10-CM

## 2023-11-10 ENCOUNTER — OUTPATIENT (OUTPATIENT)
Dept: OUTPATIENT SERVICES | Facility: HOSPITAL | Age: 58
LOS: 1 days | End: 2023-11-10
Payer: MEDICAID

## 2023-11-10 ENCOUNTER — RESULT REVIEW (OUTPATIENT)
Age: 58
End: 2023-11-10

## 2023-11-10 DIAGNOSIS — Z12.31 ENCOUNTER FOR SCREENING MAMMOGRAM FOR MALIGNANT NEOPLASM OF BREAST: ICD-10-CM

## 2023-11-10 DIAGNOSIS — Z90.710 ACQUIRED ABSENCE OF BOTH CERVIX AND UTERUS: Chronic | ICD-10-CM

## 2023-11-10 PROCEDURE — 77067 SCR MAMMO BI INCL CAD: CPT | Mod: 26

## 2023-11-10 PROCEDURE — 77063 BREAST TOMOSYNTHESIS BI: CPT | Mod: 26

## 2023-11-10 PROCEDURE — 77067 SCR MAMMO BI INCL CAD: CPT

## 2023-11-10 PROCEDURE — 77063 BREAST TOMOSYNTHESIS BI: CPT

## 2023-11-11 DIAGNOSIS — Z12.31 ENCOUNTER FOR SCREENING MAMMOGRAM FOR MALIGNANT NEOPLASM OF BREAST: ICD-10-CM

## 2024-01-29 NOTE — PRE-ANESTHESIA EVALUATION ADULT - WEIGHT IN LBS
----- Message from Darlene Marsh, FRANNIE-CNP sent at 1/29/2024  8:53 AM EST -----  Let her know there was no shoulder fracture or dislocation. She needs to get in with ortho for further work up. Please make sure she has appointment to see them in the next 1-2 weeks.   060

## 2024-03-22 ENCOUNTER — APPOINTMENT (OUTPATIENT)
Dept: CARDIOLOGY | Facility: CLINIC | Age: 59
End: 2024-03-22

## 2024-04-01 ENCOUNTER — APPOINTMENT (OUTPATIENT)
Dept: CARDIOLOGY | Facility: CLINIC | Age: 59
End: 2024-04-01
Payer: MEDICARE

## 2024-04-01 VITALS
SYSTOLIC BLOOD PRESSURE: 130 MMHG | HEIGHT: 60 IN | WEIGHT: 162 LBS | DIASTOLIC BLOOD PRESSURE: 80 MMHG | OXYGEN SATURATION: 99 % | HEART RATE: 63 BPM | BODY MASS INDEX: 31.8 KG/M2

## 2024-04-01 DIAGNOSIS — E66.01 MORBID (SEVERE) OBESITY DUE TO EXCESS CALORIES: ICD-10-CM

## 2024-04-01 DIAGNOSIS — I10 ESSENTIAL (PRIMARY) HYPERTENSION: ICD-10-CM

## 2024-04-01 DIAGNOSIS — Z87.891 PERSONAL HISTORY OF NICOTINE DEPENDENCE: ICD-10-CM

## 2024-04-01 DIAGNOSIS — G47.33 OBSTRUCTIVE SLEEP APNEA (ADULT) (PEDIATRIC): ICD-10-CM

## 2024-04-01 PROCEDURE — G2211 COMPLEX E/M VISIT ADD ON: CPT

## 2024-04-01 PROCEDURE — 93000 ELECTROCARDIOGRAM COMPLETE: CPT

## 2024-04-01 PROCEDURE — 99214 OFFICE O/P EST MOD 30 MIN: CPT

## 2024-04-01 RX ORDER — UBIDECARENONE/VIT E ACET 100MG-5
50 MCG CAPSULE ORAL
Qty: 30 | Refills: 3 | Status: DISCONTINUED | COMMUNITY
Start: 2021-04-23 | End: 2024-04-01

## 2024-04-01 RX ORDER — AMLODIPINE BESYLATE 5 MG/1
5 TABLET ORAL DAILY
Qty: 90 | Refills: 3 | Status: ACTIVE | COMMUNITY
Start: 1900-01-01 | End: 1900-01-01

## 2024-04-01 RX ORDER — ASPIRIN 81 MG
81 TABLET, DELAYED RELEASE (ENTERIC COATED) ORAL
Refills: 0 | Status: ACTIVE | COMMUNITY

## 2024-04-01 RX ORDER — FERROUS GLUCONATE 256(28)MG
TABLET ORAL
Refills: 0 | Status: ACTIVE | COMMUNITY

## 2024-04-01 NOTE — PHYSICAL EXAM
[Well Developed] : well developed [Well Nourished] : well nourished [No Acute Distress] : no acute distress [Normal Conjunctiva] : normal conjunctiva [Normal Venous Pressure] : normal venous pressure [No Carotid Bruit] : no carotid bruit [Normal S1, S2] : normal S1, S2 [No Murmur] : no murmur [No Gallop] : no gallop [No Rub] : no rub [Good Air Entry] : good air entry [Clear Lung Fields] : clear lung fields [No Respiratory Distress] : no respiratory distress  [Soft] : abdomen soft [No Edema] : no edema [Normal Gait] : normal gait [No Rash] : no rash [No Skin Lesions] : no skin lesions [Moves all extremities] : moves all extremities [No Focal Deficits] : no focal deficits [Normal Speech] : normal speech [Normal memory] : normal memory [Alert and Oriented] : alert and oriented

## 2024-04-01 NOTE — REASON FOR VISIT
[Symptom and Test Evaluation] : symptom and test evaluation [CV Risk Factors and Non-Cardiac Disease] : CV risk factors and non-cardiac disease [[employer] ] : provided by [employer]   [Time Spent: ____ minutes] : Total time spent using  services: [unfilled] minutes. The patient's primary language is not English thus required  services. [Interpreters_IDNumber] : [id number] [Interpreters_FullName] : Thelma [TWNoteComboBox1] : Citizen of [place] [FreeTextEntry1] : Ms. BRANDY HARDEN is a 59 year old woman with PMHx of HTN, CAC=0, morbid obesity s/p bariatric surgery who is presenting for follow up. She feels well today and has no concerns today. She has no SOB, VASQUEZ or chest pain.   TTE 3/15/23 CONCLUSIONS: 1.The left ventricular systolic function is normal with an ejection fraction of 64 %. 2.Normal right ventricular size and normal systolic function. 3.Mild tricuspid regurgitation. 4.No echocardiographic evidence of pulmonary hypertension. 5.No prior echo is available for comparison  CAC 10/9/23 IMPRESSION: The total Agatston coronary artery calcium score equals 0.

## 2024-04-01 NOTE — ASSESSMENT
[FreeTextEntry1] : Ms. BRANDY HARDEN is a 59 year old woman with PMHx of HTN, CAC=0, morbid obesity s/p bariatric surgery who is presenting for follow up.  -Reviewed TTE, labs, CAC -Continue amlodipine 5 daily for HTN, which is well controlled -Defer statin given CAC=0 -Encouraged improved lifestyle modifications with these recommendations below: -Plant-based and Mediterranean diets, along with increased fruit, nut, vegetable, legume, and lean vegetable or animal protein (preferably fish) consumption -Engage in at least 150 minutes per week of accumulated moderate-intensity aerobic physical activity or 75 minutes per week of vigorous-intensity aerobic physical activity  Time in encounter, including face to face visit and time reviewing chart:  33 minutes  Addy Tobin MD, FACC Non-Invasive Cardiology [address], [address] Office: [phone]

## 2024-04-01 NOTE — REVIEW OF SYSTEMS
[Negative] : Heme/Lymph [Dyspnea on exertion] : not dyspnea during exertion [SOB] : no shortness of breath [Lower Ext Edema] : no extremity edema [Chest Discomfort] : no chest discomfort [Orthopnea] : no orthopnea [Leg Claudication] : no intermittent leg claudication [Palpitations] : no palpitations [PND] : no PND [Syncope] : no syncope

## 2024-04-05 ENCOUNTER — APPOINTMENT (OUTPATIENT)
Dept: INTERNAL MEDICINE | Facility: CLINIC | Age: 59
End: 2024-04-05

## 2024-09-19 ENCOUNTER — OUTPATIENT (OUTPATIENT)
Dept: OUTPATIENT SERVICES | Facility: HOSPITAL | Age: 59
LOS: 1 days | End: 2024-09-19
Payer: MEDICARE

## 2024-09-19 ENCOUNTER — APPOINTMENT (OUTPATIENT)
Dept: PODIATRY | Facility: CLINIC | Age: 59
End: 2024-09-19

## 2024-09-19 DIAGNOSIS — Z90.710 ACQUIRED ABSENCE OF BOTH CERVIX AND UTERUS: Chronic | ICD-10-CM

## 2024-09-19 DIAGNOSIS — Z00.00 ENCOUNTER FOR GENERAL ADULT MEDICAL EXAMINATION WITHOUT ABNORMAL FINDINGS: ICD-10-CM

## 2024-09-19 PROCEDURE — 99203 OFFICE O/P NEW LOW 30 MIN: CPT

## 2024-09-19 RX ORDER — MELOXICAM 15 MG/1
15 TABLET ORAL DAILY
Qty: 30 | Refills: 1 | Status: ACTIVE | COMMUNITY
Start: 2024-09-19 | End: 1900-01-01

## 2024-09-29 NOTE — ASSESSMENT
[FreeTextEntry1] : Patient examined, history and chart reviewed discussed right foot injury  rx antiinflammatory for swelling  Discussed rice therapy   Will follow up in 4 weeks

## 2024-09-29 NOTE — HISTORY OF PRESENT ILLNESS
[FreeTextEntry1] : right foot injury    Continued swelling s/p fall   no fractures noted    patient still in pain    xray notes  osteoarthritis

## 2024-09-29 NOTE — PHYSICAL EXAM
[General Appearance - Alert] : alert [General Appearance - In No Acute Distress] : in no acute distress [Ankle Swelling (On Exam)] : not present [Varicose Veins Of Lower Extremities] : not present [Delayed in the Right Toes] : capillary refills normal in right toes [Delayed in the Left Toes] : capillary refills normal in the left toes [2+] : left foot dorsalis pedis 2+ [No Joint Swelling] : no joint swelling [] : normal gait [Normal Foot/Ankle] : Both lower extremities were exposed and visualized. Standing exam demonstrates normal foot posture and alignment. Hindfoot exam shows no hindfoot valgus or varus [Sensation] : the sensory exam was normal to light touch and pinprick [No Focal Deficits] : no focal deficits [Deep Tendon Reflexes (DTR)] : deep tendon reflexes were 2+ and symmetric [Motor Exam] : the motor exam was normal [Impaired Insight] : insight and judgment were intact [Oriented To Time, Place, And Person] : oriented to person, place, and time [Affect] : the affect was normal

## 2024-10-01 ENCOUNTER — APPOINTMENT (OUTPATIENT)
Dept: ORTHOPEDIC SURGERY | Facility: CLINIC | Age: 59
End: 2024-10-01
Payer: MEDICARE

## 2024-10-01 DIAGNOSIS — M54.41 LUMBAGO WITH SCIATICA, RIGHT SIDE: ICD-10-CM

## 2024-10-01 DIAGNOSIS — M17.0 BILATERAL PRIMARY OSTEOARTHRITIS OF KNEE: ICD-10-CM

## 2024-10-01 DIAGNOSIS — G89.29 LUMBAGO WITH SCIATICA, RIGHT SIDE: ICD-10-CM

## 2024-10-01 PROCEDURE — 99203 OFFICE O/P NEW LOW 30 MIN: CPT

## 2024-10-01 PROCEDURE — 73560 X-RAY EXAM OF KNEE 1 OR 2: CPT | Mod: LT

## 2024-10-01 PROCEDURE — 72110 X-RAY EXAM L-2 SPINE 4/>VWS: CPT

## 2024-10-01 RX ORDER — DICLOFENAC SODIUM 3 G/100G
3 GEL TOPICAL TWICE DAILY
Qty: 1 | Refills: 2 | Status: ACTIVE | COMMUNITY
Start: 2024-10-01 | End: 1900-01-01
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
## 2024-10-01 NOTE — REASON FOR VISIT
[FreeTextEntry2] : Bilateral knee pain chronic; chronic midline back pain with intermittent right-sided sciatica symptoms
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
## 2024-10-01 NOTE — IMAGING
[de-identified] : Middle-age woman was able to walk without assistive device.  Able to get up onto the exam table without assistance.  Able to get off of the table without any assistance.  Walks with some antalgia.  She is in no distress.  Physical examination: Bilateral knees: Medial compartment joint line tenderness.  Abnormal patellofemoral grind test.  No varus valgus instability.  Pain with varus and valgus stress testing.  Equivocal Apley's and Pan's.  No geniculate lymph nodes or masses but somewhat obese habitus makes examination somewhat difficult.  Calf is soft no cords.  No effusion.  Knee motion 0-125 degrees.  Radiographs: Bilateral knees (AP, lateral): Marginal osteophytes medially and laterally as well as about the patellofemoral joint.  Narrowing of the medial compartment of the knee.  The majority of the osteophytes around the patellofemoral joint both knees.  Lumbar spine (AP, lateral): Obscured by bowel gas on lateral radiograph.  AP suggest some degenerative disease.  There is some suggestion of facet arthritis.  Limited examination due to bowel gas.

## 2024-10-02 DIAGNOSIS — M79.671 PAIN IN RIGHT FOOT: ICD-10-CM

## 2024-10-02 DIAGNOSIS — M19.90 UNSPECIFIED OSTEOARTHRITIS, UNSPECIFIED SITE: ICD-10-CM
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
## 2024-10-02 DIAGNOSIS — M25.571 PAIN IN RIGHT ANKLE AND JOINTS OF RIGHT FOOT: ICD-10-CM

## 2024-12-05 ENCOUNTER — EMERGENCY (EMERGENCY)
Facility: HOSPITAL | Age: 59
LOS: 0 days | Discharge: HOME | End: 2024-12-05
Attending: EMERGENCY MEDICINE
Payer: MEDICARE

## 2024-12-05 VITALS
DIASTOLIC BLOOD PRESSURE: 87 MMHG | SYSTOLIC BLOOD PRESSURE: 170 MMHG | HEART RATE: 60 BPM | RESPIRATION RATE: 18 BRPM | OXYGEN SATURATION: 99 % | TEMPERATURE: 98 F

## 2024-12-05 VITALS
HEART RATE: 66 BPM | DIASTOLIC BLOOD PRESSURE: 73 MMHG | OXYGEN SATURATION: 100 % | SYSTOLIC BLOOD PRESSURE: 147 MMHG | RESPIRATION RATE: 16 BRPM | WEIGHT: 130.07 LBS | TEMPERATURE: 97 F

## 2024-12-05 DIAGNOSIS — H53.8 OTHER VISUAL DISTURBANCES: ICD-10-CM

## 2024-12-05 DIAGNOSIS — I10 ESSENTIAL (PRIMARY) HYPERTENSION: ICD-10-CM

## 2024-12-05 DIAGNOSIS — R51.9 HEADACHE, UNSPECIFIED: ICD-10-CM

## 2024-12-05 DIAGNOSIS — Z90.710 ACQUIRED ABSENCE OF BOTH CERVIX AND UTERUS: Chronic | ICD-10-CM

## 2024-12-05 DIAGNOSIS — Z98.84 BARIATRIC SURGERY STATUS: ICD-10-CM
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
## 2024-12-05 DIAGNOSIS — Z86.2 PERSONAL HISTORY OF DISEASES OF THE BLOOD AND BLOOD-FORMING ORGANS AND CERTAIN DISORDERS INVOLVING THE IMMUNE MECHANISM: ICD-10-CM

## 2024-12-05 DIAGNOSIS — S09.90XA UNSPECIFIED INJURY OF HEAD, INITIAL ENCOUNTER: ICD-10-CM
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
## 2024-12-05 LAB
ALBUMIN SERPL ELPH-MCNC: 4.1 G/DL — SIGNIFICANT CHANGE UP (ref 3.5–5.2)
ALP SERPL-CCNC: 103 U/L — SIGNIFICANT CHANGE UP (ref 30–115)
ALT FLD-CCNC: 14 U/L — SIGNIFICANT CHANGE UP (ref 0–41)
ANION GAP SERPL CALC-SCNC: 10 MMOL/L — SIGNIFICANT CHANGE UP (ref 7–14)
AST SERPL-CCNC: 19 U/L — SIGNIFICANT CHANGE UP (ref 0–41)
BASOPHILS # BLD AUTO: 0.06 K/UL — SIGNIFICANT CHANGE UP (ref 0–0.2)
BASOPHILS NFR BLD AUTO: 0.7 % — SIGNIFICANT CHANGE UP (ref 0–1)
BILIRUB SERPL-MCNC: 0.2 MG/DL — SIGNIFICANT CHANGE UP (ref 0.2–1.2)
BUN SERPL-MCNC: 15 MG/DL — SIGNIFICANT CHANGE UP (ref 10–20)
CALCIUM SERPL-MCNC: 10.3 MG/DL — SIGNIFICANT CHANGE UP (ref 8.4–10.5)
CHLORIDE SERPL-SCNC: 103 MMOL/L — SIGNIFICANT CHANGE UP (ref 98–110)
CO2 SERPL-SCNC: 29 MMOL/L — SIGNIFICANT CHANGE UP (ref 17–32)
CREAT SERPL-MCNC: 0.7 MG/DL — SIGNIFICANT CHANGE UP (ref 0.7–1.5)
EGFR: 99 ML/MIN/1.73M2 — SIGNIFICANT CHANGE UP
EOSINOPHIL # BLD AUTO: 0.21 K/UL — SIGNIFICANT CHANGE UP (ref 0–0.7)
EOSINOPHIL NFR BLD AUTO: 2.4 % — SIGNIFICANT CHANGE UP (ref 0–8)
GLUCOSE SERPL-MCNC: 93 MG/DL — SIGNIFICANT CHANGE UP (ref 70–99)
HCT VFR BLD CALC: 39.9 % — SIGNIFICANT CHANGE UP (ref 37–47)
HGB BLD-MCNC: 13 G/DL — SIGNIFICANT CHANGE UP (ref 12–16)
IMM GRANULOCYTES NFR BLD AUTO: 0.2 % — SIGNIFICANT CHANGE UP (ref 0.1–0.3)
LYMPHOCYTES # BLD AUTO: 2.71 K/UL — SIGNIFICANT CHANGE UP (ref 1.2–3.4)
LYMPHOCYTES # BLD AUTO: 31.4 % — SIGNIFICANT CHANGE UP (ref 20.5–51.1)
MCHC RBC-ENTMCNC: 25.7 PG — LOW (ref 27–31)
MCHC RBC-ENTMCNC: 32.6 G/DL — SIGNIFICANT CHANGE UP (ref 32–37)
MCV RBC AUTO: 78.9 FL — LOW (ref 81–99)
MONOCYTES # BLD AUTO: 0.71 K/UL — HIGH (ref 0.1–0.6)
MONOCYTES NFR BLD AUTO: 8.2 % — SIGNIFICANT CHANGE UP (ref 1.7–9.3)
NEUTROPHILS # BLD AUTO: 4.93 K/UL — SIGNIFICANT CHANGE UP (ref 1.4–6.5)
NEUTROPHILS NFR BLD AUTO: 57.1 % — SIGNIFICANT CHANGE UP (ref 42.2–75.2)
NRBC # BLD: 0 /100 WBCS — SIGNIFICANT CHANGE UP (ref 0–0)
PLATELET # BLD AUTO: 281 K/UL — SIGNIFICANT CHANGE UP (ref 130–400)
PMV BLD: 10.6 FL — HIGH (ref 7.4–10.4)
POTASSIUM SERPL-MCNC: 4.7 MMOL/L — SIGNIFICANT CHANGE UP (ref 3.5–5)
POTASSIUM SERPL-SCNC: 4.7 MMOL/L — SIGNIFICANT CHANGE UP (ref 3.5–5)
PROT SERPL-MCNC: 7 G/DL — SIGNIFICANT CHANGE UP (ref 6–8)
RBC # BLD: 5.06 M/UL — SIGNIFICANT CHANGE UP (ref 4.2–5.4)
RBC # FLD: 13.8 % — SIGNIFICANT CHANGE UP (ref 11.5–14.5)
SODIUM SERPL-SCNC: 142 MMOL/L — SIGNIFICANT CHANGE UP (ref 135–146)
WBC # BLD: 8.64 K/UL — SIGNIFICANT CHANGE UP (ref 4.8–10.8)
WBC # FLD AUTO: 8.64 K/UL — SIGNIFICANT CHANGE UP (ref 4.8–10.8)

## 2024-12-05 PROCEDURE — 36415 COLL VENOUS BLD VENIPUNCTURE: CPT

## 2024-12-05 PROCEDURE — 70450 CT HEAD/BRAIN W/O DYE: CPT | Mod: 26,XU,MC

## 2024-12-05 PROCEDURE — 93005 ELECTROCARDIOGRAM TRACING: CPT

## 2024-12-05 PROCEDURE — 70496 CT ANGIOGRAPHY HEAD: CPT | Mod: 26,MC

## 2024-12-05 PROCEDURE — 93010 ELECTROCARDIOGRAM REPORT: CPT

## 2024-12-05 PROCEDURE — 70450 CT HEAD/BRAIN W/O DYE: CPT | Mod: MC

## 2024-12-05 PROCEDURE — 73562 X-RAY EXAM OF KNEE 3: CPT | Mod: 26,RT

## 2024-12-05 PROCEDURE — 96374 THER/PROPH/DIAG INJ IV PUSH: CPT | Mod: XU

## 2024-12-05 PROCEDURE — 70498 CT ANGIOGRAPHY NECK: CPT | Mod: MC

## 2024-12-05 PROCEDURE — 85025 COMPLETE CBC W/AUTO DIFF WBC: CPT

## 2024-12-05 PROCEDURE — 80053 COMPREHEN METABOLIC PANEL: CPT

## 2024-12-05 PROCEDURE — 99285 EMERGENCY DEPT VISIT HI MDM: CPT

## 2024-12-05 PROCEDURE — 70496 CT ANGIOGRAPHY HEAD: CPT | Mod: MC

## 2024-12-05 PROCEDURE — 73562 X-RAY EXAM OF KNEE 3: CPT | Mod: RT

## 2024-12-05 PROCEDURE — 99285 EMERGENCY DEPT VISIT HI MDM: CPT | Mod: 25

## 2024-12-05 PROCEDURE — 70498 CT ANGIOGRAPHY NECK: CPT | Mod: 26,MC

## 2024-12-05 RX ORDER — METOCLOPRAMIDE HYDROCHLORIDE 10 MG/1
10 TABLET ORAL ONCE
Refills: 0 | Status: COMPLETED | OUTPATIENT
Start: 2024-12-05 | End: 2024-12-05

## 2024-12-05 RX ORDER — 0.9 % SODIUM CHLORIDE 0.9 %
1000 INTRAVENOUS SOLUTION INTRAVENOUS ONCE
Refills: 0 | Status: COMPLETED | OUTPATIENT
Start: 2024-12-05 | End: 2024-12-05

## 2024-12-05 RX ORDER — FLUORESCEIN SODIUM 100 %
1 POWDER (GRAM) MISCELLANEOUS ONCE
Refills: 0 | Status: COMPLETED | OUTPATIENT
Start: 2024-12-05 | End: 2024-12-05

## 2024-12-05 RX ADMIN — Medication 1 APPLICATION(S): at 14:06

## 2024-12-05 RX ADMIN — Medication 1000 MILLILITER(S): at 10:59

## 2024-12-05 RX ADMIN — METOCLOPRAMIDE HYDROCHLORIDE 10 MILLIGRAM(S): 10 TABLET ORAL at 10:59

## 2024-12-05 NOTE — ED PROVIDER NOTE - OBJECTIVE STATEMENT
Patient is a 59 year old female with pmhx of htn presents for left ocular-temporal headache over the past 5 days intermittently with left sided eye blurry vision intermittently. She notes that 1 week ago she had episode of "total blindness" and she was unable to see. She did not seek medical attention at that time after symptoms resolved. She also notes last sunday she fell and hit the right side of her head without LoC or AC use. She denies any other complaints, trauma, or injuries at this time.

## 2024-12-05 NOTE — ED ADULT NURSE NOTE - CHIEF COMPLAINT QUOTE
Caller: XAVIER Earl    Doctor: Niranjan    Reason for call: Checking on status of clients next appointmnet    Call back#: N/A  
Headache on left side with left eye blurriness x3 days
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
## 2024-12-05 NOTE — ED PROVIDER NOTE - NSPTACCESSSVCSAPPTDETAILS_ED_ALL_ED_FT
please refer for follow up of head ache with visual symptoms    please also refer to opthalmology for same

## 2024-12-05 NOTE — ED PROVIDER NOTE - PROGRESS NOTE DETAILS
KA - Patient reassessed with  Carlos [id number]. Patient states HA has completed resolved however eye discomfort remains. See PE for stain. Will treat due to symptoms. Will dc with outpatient follow up. Other visual complaints not present at this time.

## 2024-12-05 NOTE — ED ADULT NURSE NOTE - NSFALLHARMRISKINTERV_ED_ALL_ED
Communicate risk of Fall with Harm to all staff, patient, and family/Provide visual cue: red socks, yellow wristband, yellow gown, etc/Reinforce activity limits and safety measures with patient and family/Use of alarms - bed, stretcher, chair and/or video monitoring/Bed in lowest position, wheels locked, appropriate side rails in place/Call bell, personal items and telephone in reach/Instruct patient to call for assistance before getting out of bed/chair/stretcher/Non-slip footwear applied when patient is off stretcher/[place] to call system/Physically safe environment - no spills, clutter or unnecessary equipment/Purposeful Proactive Rounding/Room/bathroom lighting operational, light cord in reach

## 2024-12-05 NOTE — ED PROVIDER NOTE - NSFOLLOWUPINSTRUCTIONS_ED_ALL_ED_FT
FOLLOW UP WITH A NEUROLOGIST AND EYE DOCTOR    Use your drops as follows: 1-2 drops every 3-4 hours for 7 days.      Nuestros coordinadores de referencias del departamento de emergencias se comunicarán con usted en las próximas 24 a  48 horas de 9:00 a. m. a 5:00 p. m. (de lunes a viernes) con theodore claire de seguimiento. Espere theodore llamada telefónica del hospital en camacho período de tiempo. Si no recibe theodore llamada o si tiene alguna pregunta o inquietud, puede comunicarse con kalpesh king [phone].    Our Emergency Department Referral Coordinators will be reaching out to you in the next 24-48 hours from 9:00am to 5:00pm with a follow up appointment. Please expect a phone call from the hospital in that time frame. If you do not receive a call or if you have any questions or concerns, you can reach them at   [phone]    Medical Screening Exam  A medical screening exam helps determine whether or not you need emergency medical treatment.    During the medical screening exam, a health care provider does a short physical exam and medical history to assess:    Your current symptoms.  Your overall health.    Depending on your symptoms, you may need additional tests.    What are the possible outcomes of a medical screening exam?  Your medical screening exam may determine that:    You do not need emergency treatment at this time.  You need treatment right away.  You need to be transferred to another medical center.    When should I seek medical care?  If you have a regular health care provider, make an appointment for a follow-up visit with him or her. If you do not have a regular health care provider, ask about resources in your community.    Get help right away if:  Your condition may change over time. If your condition gets worse or you develop new or troubling symptoms before you see your health care provider, go to an emergency department right away.    In an emergency:     Call 911 or have someone drive you to the nearest hospital.  Do not drive yourself.  This information is not intended to replace advice given to you by your health care provider. Make sure you discuss any questions you have with your health care provider.

## 2024-12-05 NOTE — ED PROVIDER NOTE - ATTENDING APP SHARED VISIT CONTRIBUTION OF CARE
[id number] used.     59-year-old female past med history of gastric bypass, anemia presents with headache.  Patient reports last 3 days she has been having a left-sided headache radiating from eye into her head.  Has been intermittent, sharp.  No nausea or vomiting.  No numbness tingling weakness.  No chest pain shortness of breath or palpitations.  Patient also separately reports that she been having worsening pain to her right knee and states keeps giving out.  Reports having some falls due to.  Last fall was on Sunday.  Positive head trauma.  No LOC.  No anticoagulation.  Patient also separately reports that 1 week ago she had an episode of blindness in both eyes lasting for 6 hours and fully resolved.  Denies any blurry vision at this time.    CONSTITUTIONAL: Well-developed; well-nourished; in no acute distress.   SKIN: warm, dry  HEAD: Normocephalic; atraumatic.  EYES: PERRL, EOMI, no conjunctival erythema. 20/50 vision bl.   ENT: No nasal discharge; airway clear.  NECK: Supple; non tender.  CARD: S1, S2 normal;  Regular rate and rhythm.   RESP: No wheezes, rales or rhonchi.  ABD: soft non tender, non distended, no rebound or guarding  EXT: Normal ROM.  5/5 strength in all 4 extremities   LYMPH: No acute cervical adenopathy.  NEURO: A&Ox3, CN 2-11 intact, moving all extremities, 5/5 strength, equal sensation bilaterally  PSYCH: Cooperative, appropriate.

## 2024-12-05 NOTE — ED ADULT NURSE NOTE - PAIN: BODY LOCATION
leg pain and knee swollen S/P fall yesterday/lower/Right: [place] Counseling: I discussed with the patient the risks of tildrakizumab including but not limited to immunosuppression, malignancy, posterior leukoencephalopathy syndrome, and serious infections.  The patient understands that monitoring is required including a PPD at baseline and must alert us or the primary physician if symptoms of infection or other concerning signs are noted.

## 2024-12-05 NOTE — ED PROVIDER NOTE - PATIENT PORTAL LINK FT
You can access the FollowMyHealth Patient Portal offered by [facility] by registering at the following website: [email or web address]. By joining [facility]’s FollowMyHealth portal, you will also be able to view your health information using other applications (apps) compatible with our system.

## 2024-12-05 NOTE — ED PROVIDER NOTE - CLINICAL SUMMARY MEDICAL DECISION MAKING FREE TEXT BOX
Patient presents with 3 days of headaches and eye pain.  Normal neurologic exam in the ED.  Patient also reports an episode of vision loss for a few hours 7 days ago.  No repeat episodes.  Normal vision in the ED.  Labs and imaging done.  No acute findings.  Feeling better.  All results discussed.  Patient feels comfortable going home at this time.  Understands to follow-up with neurologist and ophthalmologist.  Strict return precautions discussed.

## 2024-12-05 NOTE — ED PROVIDER NOTE - PHYSICAL EXAMINATION
As Follows:  CONST: Well appearing in NAD  EYES: PERRL, EOMI, Sclera and conjunctiva clear. VA 20/50 bilaterally, uncorrected vision. Pressure 17mmHg to right eye, 10mmHg to left eye.   ENT: No nasal discharge. Oropharynx normal appearing, no erythema / exudates. Uvula midline. Airway intact.   CARD: No murmurs, rubs, or gallops; Normal rate and rhythm  RESP: BS Equal B/L, No wheezes, rhonchi or rales. No distress or accessory breathing  GI: Soft, non-tender, non-distended.  SKIN: Warm, dry, no acute rashes. MMM  NEURO: Alert and Oriented, No focal deficits. Strength and sensation intact. Steady gait. As Follows:  CONST: Well appearing in NAD  EYES: PERRL, EOMI, Sclera and conjunctiva clear. VA 20/50 bilaterally, uncorrected vision. Pressure 17mmHg to right eye, 10mmHg to left eye. Fluorescin stain with questionable abrasion to left eye.  ENT: No nasal discharge. Oropharynx normal appearing, no erythema / exudates. Uvula midline. Airway intact.   CARD: No murmurs, rubs, or gallops; Normal rate and rhythm  RESP: BS Equal B/L, No wheezes, rhonchi or rales. No distress or accessory breathing  GI: Soft, non-tender, non-distended.  SKIN: Warm, dry, no acute rashes. MMM  NEURO: Alert and Oriented, No focal deficits. Strength and sensation intact. Steady gait.

## 2024-12-05 NOTE — ED ADULT NURSE REASSESSMENT NOTE - NS ED NURSE REASSESS COMMENT FT1
Pt reassessed A/O times 4 Vs stable report felling  slight better breathing better ,pt is seen evaluate by Ed attending did eat 755 of lunch clear to go home with family members NAD notec .

## 2024-12-06 NOTE — CHART NOTE - NSCHARTNOTEFT_GEN_A_CORE
Neuro appt formerly [facility] 1/15 @ 1:15 pm with MARIO Lara [facility].

## 2025-02-24 ENCOUNTER — APPOINTMENT (OUTPATIENT)
Dept: NEUROLOGY | Facility: CLINIC | Age: 60
End: 2025-02-24

## 2025-04-02 NOTE — ED PROVIDER NOTE - CCCP TRG CHIEF CMPLNT
Attempted to reach patient to work in tomorrow or Friday but voicemail not working. Will try again later.   flank pain

## 2025-04-07 ENCOUNTER — RESULT CHARGE (OUTPATIENT)
Age: 60
End: 2025-04-07

## 2025-04-07 ENCOUNTER — APPOINTMENT (OUTPATIENT)
Dept: CARDIOLOGY | Facility: CLINIC | Age: 60
End: 2025-04-07
Payer: MEDICARE

## 2025-04-07 ENCOUNTER — NON-APPOINTMENT (OUTPATIENT)
Age: 60
End: 2025-04-07

## 2025-04-07 VITALS
BODY MASS INDEX: 30.04 KG/M2 | HEIGHT: 60 IN | WEIGHT: 153 LBS | SYSTOLIC BLOOD PRESSURE: 137 MMHG | DIASTOLIC BLOOD PRESSURE: 83 MMHG | HEART RATE: 52 BPM

## 2025-04-07 DIAGNOSIS — G47.33 OBSTRUCTIVE SLEEP APNEA (ADULT) (PEDIATRIC): ICD-10-CM

## 2025-04-07 DIAGNOSIS — I10 ESSENTIAL (PRIMARY) HYPERTENSION: ICD-10-CM

## 2025-04-07 DIAGNOSIS — E66.01 MORBID (SEVERE) OBESITY DUE TO EXCESS CALORIES: ICD-10-CM

## 2025-04-07 PROCEDURE — 93000 ELECTROCARDIOGRAM COMPLETE: CPT

## 2025-04-07 PROCEDURE — G2211 COMPLEX E/M VISIT ADD ON: CPT

## 2025-04-07 PROCEDURE — 99214 OFFICE O/P EST MOD 30 MIN: CPT

## 2025-04-07 RX ORDER — CALCIUM NO.38/D3/MAG/BORON ASP 500MG/15ML
LIQUID (ML) ORAL
Refills: 0 | Status: ACTIVE | COMMUNITY

## 2025-04-07 RX ORDER — MV-MIN/FOLIC/VIT K/LYCOP/COQ10 200-100MCG
CAPSULE ORAL
Refills: 0 | Status: ACTIVE | COMMUNITY

## 2025-04-08 ENCOUNTER — APPOINTMENT (OUTPATIENT)
Dept: ORTHOPEDIC SURGERY | Facility: CLINIC | Age: 60
End: 2025-04-08

## 2025-04-08 ENCOUNTER — NON-APPOINTMENT (OUTPATIENT)
Age: 60
End: 2025-04-08

## 2025-07-08 ENCOUNTER — APPOINTMENT (OUTPATIENT)
Dept: NEUROLOGY | Facility: CLINIC | Age: 60
End: 2025-07-08
Payer: MEDICARE

## 2025-07-08 VITALS
BODY MASS INDEX: 29.1 KG/M2 | WEIGHT: 154.13 LBS | HEART RATE: 58 BPM | SYSTOLIC BLOOD PRESSURE: 133 MMHG | HEIGHT: 61 IN | OXYGEN SATURATION: 99 % | DIASTOLIC BLOOD PRESSURE: 83 MMHG

## 2025-07-08 PROCEDURE — 99203 OFFICE O/P NEW LOW 30 MIN: CPT

## 2025-07-08 RX ORDER — OMEPRAZOLE 20 MG/1
20 TABLET, DELAYED RELEASE ORAL
Refills: 0 | Status: ACTIVE | COMMUNITY

## 2025-07-08 RX ORDER — MELOXICAM 15 MG/1
15 TABLET ORAL
Refills: 0 | Status: ACTIVE | COMMUNITY